# Patient Record
Sex: MALE | Race: BLACK OR AFRICAN AMERICAN | Employment: OTHER | URBAN - METROPOLITAN AREA
[De-identification: names, ages, dates, MRNs, and addresses within clinical notes are randomized per-mention and may not be internally consistent; named-entity substitution may affect disease eponyms.]

---

## 2019-06-27 ENCOUNTER — HOSPITAL ENCOUNTER (OUTPATIENT)
Dept: NUCLEAR MEDICINE | Age: 64
Discharge: HOME | End: 2019-06-27
Attending: UROLOGY
Payer: MEDICARE

## 2019-06-27 ENCOUNTER — HOSPITAL ENCOUNTER (OUTPATIENT)
Dept: CT IMAGING | Age: 64
Discharge: HOME | End: 2019-06-27
Attending: UROLOGY
Payer: MEDICARE

## 2019-06-27 DIAGNOSIS — C61 PROSTATE CANCER (HCC): ICD-10-CM

## 2019-06-27 LAB — CREAT UR-MCNC: 0.6 MG/DL (ref 0.6–1.3)

## 2019-06-27 PROCEDURE — 74177 CT ABD & PELVIS W/CONTRAST: CPT

## 2019-06-27 PROCEDURE — 74178 CT ABD&PLV WO CNTR FLWD CNTR: CPT

## 2019-06-27 PROCEDURE — 78306 BONE IMAGING WHOLE BODY: CPT

## 2019-06-27 PROCEDURE — 74011636320 HC RX REV CODE- 636/320: Performed by: UROLOGY

## 2019-06-27 PROCEDURE — 82565 ASSAY OF CREATININE: CPT

## 2019-06-27 RX ADMIN — IOPAMIDOL 100 ML: 612 INJECTION, SOLUTION INTRAVENOUS at 08:18

## 2019-06-28 ENCOUNTER — HOSPITAL ENCOUNTER (EMERGENCY)
Age: 64
Discharge: HOME | End: 2019-06-28
Attending: EMERGENCY MEDICINE
Payer: MEDICARE

## 2019-06-28 VITALS
BODY MASS INDEX: 27.83 KG/M2 | HEART RATE: 85 BPM | RESPIRATION RATE: 12 BRPM | OXYGEN SATURATION: 95 % | SYSTOLIC BLOOD PRESSURE: 128 MMHG | WEIGHT: 210 LBS | DIASTOLIC BLOOD PRESSURE: 79 MMHG | HEIGHT: 73 IN | TEMPERATURE: 98.7 F

## 2019-06-28 DIAGNOSIS — M62.838 MUSCLE SPASMS OF NECK: ICD-10-CM

## 2019-06-28 DIAGNOSIS — Z04.1 EXAM FOLLOWING MVC (MOTOR VEHICLE COLLISION), NO APPARENT INJURY: Primary | ICD-10-CM

## 2019-06-28 PROCEDURE — 99284 EMERGENCY DEPT VISIT MOD MDM: CPT

## 2019-06-28 PROCEDURE — 74011250637 HC RX REV CODE- 250/637: Performed by: EMERGENCY MEDICINE

## 2019-06-28 RX ORDER — CYCLOBENZAPRINE HCL 10 MG
5 TABLET ORAL
Status: COMPLETED | OUTPATIENT
Start: 2019-06-28 | End: 2019-06-28

## 2019-06-28 RX ORDER — IBUPROFEN 600 MG/1
600 TABLET ORAL
Status: COMPLETED | OUTPATIENT
Start: 2019-06-28 | End: 2019-06-28

## 2019-06-28 RX ORDER — CYCLOBENZAPRINE HCL 10 MG
5 TABLET ORAL
Qty: 4 TAB | Refills: 0 | Status: SHIPPED | OUTPATIENT
Start: 2019-06-28 | End: 2019-07-02

## 2019-06-28 RX ADMIN — IBUPROFEN 600 MG: 600 TABLET ORAL at 13:22

## 2019-06-28 RX ADMIN — CYCLOBENZAPRINE HYDROCHLORIDE 5 MG: 10 TABLET, FILM COATED ORAL at 13:22

## 2019-06-28 NOTE — ED NOTES
I have reviewed discharge instructions with the patient. The patient verbalized understanding. Patient armband removed and given to patient to take home. Patient was informed of the privacy risks if armband lost or stolen. Pt alert, oriented x4 and ambulatory out of ER in NAD at this time. VSS. Pain is a 5/10.
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
## 2019-06-28 NOTE — PROGRESS NOTES
Neg ct scan and bone scan for prostate mets.    Several degenerative dz    pls setup for f/u appt to determine treatment in 1-2 weeks   If he is considering surgery, setup with Dr Steven Cantrell
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
## 2024-03-22 NOTE — PROGRESS NOTES
tablet    finasteride (PROSCAR) 5 MG tablet Take 5 mg by mouth    fluticasone (FLONASE) 50 MCG/ACT nasal spray ceived the following from [facility]: Outside name: fluticasone propionate (FLONASE) 50 mcg/actuation nasal spray    HYDROcodone-acetaminophen (NORCO) 5-325 MG per tablet Take 1 tablet by mouth every 4 hours as needed.    ibuprofen (ADVIL;MOTRIN) 800 MG tablet Take 1 tablet by mouth    methocarbamol (ROBAXIN) 500 MG tablet Take 500 mg by mouth 4 times daily    polyethylene glycol (GOLYTELY) 236 g solution AS DIRECTED    rosuvastatin (CRESTOR) 10 MG tablet Take 10 mg by mouth    tamsulosin (FLOMAX) 0.4 MG capsule ceived the following from [facility]: Outside name: tamsulosin (FLOMAX) 0.4 mg capsule     No current facility-administered medications for this visit.        PHYSICAL EXAMINATION:  Temp 98 °F (36.7 °C) (Temporal)   Ht 1.854 m (6' 1\")   Wt 101.2 kg (223 lb)   BMI 29.42 kg/m²    Appearance: Alert, well appearing and pleasant patient who is in no distress, oriented to person, place/time, and who follows commands.  HEENT: Mode Griffin hears well, does not require hearing aids. His sclera of the eyes are non-icteric. He is breathing normally and no respiratory accessory muscle use is noted. No JVD present and Neck ROM within normal limits.   Psychiatric: Affect and mood are appropriate. Oriented x3  Cardiovascular/Peripheral Vascular: Normal pulses to each foot.  Integumentary: No rashes. Warm and normal color. No drainage.   Gait: Antalgic with varus knees  Sensory Exam: Intact/Normal Sensation    Lymphatic: No evidence of Lymphedema  Vascular:       Pulses: palpable  Varicosities none  Wounds/Abrasion: Well-healed arthroscopy portals right knee  Neuro: Negative, no tremors  ORTHO EXAMINATION:  Examination Right knee Left knee   Skin Well-healed arthroscopy portals Intact   Range of motion 90-10 100-0   Effusion + -   Medial joint line tenderness + +   Lateral joint

## 2024-03-28 ENCOUNTER — OFFICE VISIT (OUTPATIENT)
Age: 69
End: 2024-03-28
Payer: OTHER GOVERNMENT

## 2024-03-28 VITALS — TEMPERATURE: 98 F | WEIGHT: 223 LBS | BODY MASS INDEX: 29.55 KG/M2 | HEIGHT: 73 IN

## 2024-03-28 DIAGNOSIS — M25.561 CHRONIC PAIN OF RIGHT KNEE: ICD-10-CM

## 2024-03-28 DIAGNOSIS — M21.162 GENU VARUM OF BOTH LOWER EXTREMITIES: ICD-10-CM

## 2024-03-28 DIAGNOSIS — M17.11 UNILATERAL PRIMARY OSTEOARTHRITIS, RIGHT KNEE: Primary | ICD-10-CM

## 2024-03-28 DIAGNOSIS — G89.29 CHRONIC PAIN OF RIGHT KNEE: ICD-10-CM

## 2024-03-28 DIAGNOSIS — M21.161 GENU VARUM OF BOTH LOWER EXTREMITIES: ICD-10-CM

## 2024-03-28 PROCEDURE — 99204 OFFICE O/P NEW MOD 45 MIN: CPT | Performed by: SPECIALIST

## 2024-03-28 PROCEDURE — 1123F ACP DISCUSS/DSCN MKR DOCD: CPT | Performed by: SPECIALIST

## 2024-03-28 PROCEDURE — 73564 X-RAY EXAM KNEE 4 OR MORE: CPT | Performed by: SPECIALIST

## 2024-05-24 ENCOUNTER — HOSPITAL ENCOUNTER (OUTPATIENT)
Facility: HOSPITAL | Age: 69
End: 2024-05-24
Payer: OTHER GOVERNMENT

## 2024-05-24 ENCOUNTER — HOSPITAL ENCOUNTER (OUTPATIENT)
Facility: HOSPITAL | Age: 69
Discharge: HOME | End: 2024-05-24
Payer: OTHER GOVERNMENT

## 2024-05-24 DIAGNOSIS — M17.11 PRIMARY OSTEOARTHRITIS OF RIGHT KNEE: ICD-10-CM

## 2024-05-24 DIAGNOSIS — Z01.810 PREOP CARDIOVASCULAR EXAM: ICD-10-CM

## 2024-05-24 DIAGNOSIS — Z01.818 PREOPERATIVE TESTING: ICD-10-CM

## 2024-05-24 DIAGNOSIS — Z01.811 PRE-OP CHEST EXAM: ICD-10-CM

## 2024-05-24 LAB
ALBUMIN SERPL-MCNC: 3.6 G/DL (ref 3.4–5)
ALBUMIN/GLOB SERPL: 1 (ref 0.8–1.7)
ALP SERPL-CCNC: 93 U/L (ref 45–117)
ALT SERPL-CCNC: 24 U/L (ref 16–61)
ANION GAP SERPL CALC-SCNC: 3 MMOL/L (ref 3–18)
APPEARANCE UR: CLEAR
APTT PPP: 27.7 SEC (ref 23–36.4)
AST SERPL-CCNC: 22 U/L (ref 10–38)
BASOPHILS # BLD: 0 K/UL (ref 0–0.1)
BASOPHILS NFR BLD: 1 % (ref 0–2)
BILIRUB SERPL-MCNC: 0.6 MG/DL (ref 0.2–1)
BILIRUB UR QL: NEGATIVE
BUN SERPL-MCNC: 8 MG/DL (ref 7–18)
BUN/CREAT SERPL: 10 (ref 12–20)
CALCIUM SERPL-MCNC: 9.2 MG/DL (ref 8.5–10.1)
CHLORIDE SERPL-SCNC: 109 MMOL/L (ref 100–111)
CO2 SERPL-SCNC: 26 MMOL/L (ref 21–32)
COLOR UR: YELLOW
CREAT SERPL-MCNC: 0.77 MG/DL (ref 0.6–1.3)
DIFFERENTIAL METHOD BLD: ABNORMAL
EKG ATRIAL RATE: 76 BPM
EKG DIAGNOSIS: NORMAL
EKG P AXIS: 53 DEGREES
EKG P-R INTERVAL: 180 MS
EKG Q-T INTERVAL: 390 MS
EKG QRS DURATION: 78 MS
EKG QTC CALCULATION (BAZETT): 438 MS
EKG R AXIS: 35 DEGREES
EKG T AXIS: 44 DEGREES
EKG VENTRICULAR RATE: 76 BPM
EOSINOPHIL # BLD: 0.1 K/UL (ref 0–0.4)
EOSINOPHIL NFR BLD: 3 % (ref 0–5)
ERYTHROCYTE [DISTWIDTH] IN BLOOD BY AUTOMATED COUNT: 12.5 % (ref 11.6–14.5)
EST. AVERAGE GLUCOSE BLD GHB EST-MCNC: 82 MG/DL
GLOBULIN SER CALC-MCNC: 3.7 G/DL (ref 2–4)
GLUCOSE SERPL-MCNC: 116 MG/DL (ref 74–99)
GLUCOSE UR STRIP.AUTO-MCNC: NEGATIVE MG/DL
HBA1C MFR BLD: 4.5 % (ref 4.2–5.6)
HCT VFR BLD AUTO: 36.8 % (ref 36–48)
HGB BLD-MCNC: 12.3 G/DL (ref 13–16)
HGB UR QL STRIP: NEGATIVE
IMM GRANULOCYTES # BLD AUTO: 0 K/UL (ref 0–0.04)
IMM GRANULOCYTES NFR BLD AUTO: 0 % (ref 0–0.5)
INR PPP: 1.1 (ref 0.9–1.1)
KETONES UR QL STRIP.AUTO: NEGATIVE MG/DL
LEUKOCYTE ESTERASE UR QL STRIP.AUTO: NEGATIVE
LYMPHOCYTES # BLD: 1.6 K/UL (ref 0.9–3.6)
LYMPHOCYTES NFR BLD: 53 % (ref 21–52)
MCH RBC QN AUTO: 30 PG (ref 24–34)
MCHC RBC AUTO-ENTMCNC: 33.4 G/DL (ref 31–37)
MCV RBC AUTO: 89.8 FL (ref 78–100)
MONOCYTES # BLD: 0.2 K/UL (ref 0.05–1.2)
MONOCYTES NFR BLD: 8 % (ref 3–10)
NEUTS SEG # BLD: 1.1 K/UL (ref 1.8–8)
NEUTS SEG NFR BLD: 35 % (ref 40–73)
NITRITE UR QL STRIP.AUTO: NEGATIVE
NRBC # BLD: 0 K/UL (ref 0–0.01)
NRBC BLD-RTO: 0 PER 100 WBC
PH UR STRIP: 5.5 (ref 5–8)
PLATELET # BLD AUTO: 173 K/UL (ref 135–420)
PMV BLD AUTO: 10.2 FL (ref 9.2–11.8)
POTASSIUM SERPL-SCNC: 4 MMOL/L (ref 3.5–5.5)
PROT SERPL-MCNC: 7.3 G/DL (ref 6.4–8.2)
PROT UR STRIP-MCNC: NEGATIVE MG/DL
PROTHROMBIN TIME: 13.9 SEC (ref 11.9–14.7)
RBC # BLD AUTO: 4.1 M/UL (ref 4.35–5.65)
SODIUM SERPL-SCNC: 138 MMOL/L (ref 136–145)
SP GR UR REFRACTOMETRY: 1.01 (ref 1–1.03)
UROBILINOGEN UR QL STRIP.AUTO: 0.2 EU/DL (ref 0.2–1)
WBC # BLD AUTO: 3.1 K/UL (ref 4.6–13.2)

## 2024-05-24 PROCEDURE — 36415 COLL VENOUS BLD VENIPUNCTURE: CPT

## 2024-05-24 PROCEDURE — 83036 HEMOGLOBIN GLYCOSYLATED A1C: CPT

## 2024-05-24 PROCEDURE — 81003 URINALYSIS AUTO W/O SCOPE: CPT

## 2024-05-24 PROCEDURE — 71046 X-RAY EXAM CHEST 2 VIEWS: CPT

## 2024-05-24 PROCEDURE — 80053 COMPREHEN METABOLIC PANEL: CPT

## 2024-05-24 PROCEDURE — 85025 COMPLETE CBC W/AUTO DIFF WBC: CPT

## 2024-05-24 PROCEDURE — 85730 THROMBOPLASTIN TIME PARTIAL: CPT

## 2024-05-24 PROCEDURE — 93010 ELECTROCARDIOGRAM REPORT: CPT | Performed by: INTERNAL MEDICINE

## 2024-05-24 PROCEDURE — 93005 ELECTROCARDIOGRAM TRACING: CPT

## 2024-05-24 PROCEDURE — 85610 PROTHROMBIN TIME: CPT

## 2024-06-05 ENCOUNTER — OFFICE VISIT (OUTPATIENT)
Age: 69
End: 2024-06-05

## 2024-06-05 VITALS
DIASTOLIC BLOOD PRESSURE: 87 MMHG | SYSTOLIC BLOOD PRESSURE: 129 MMHG | TEMPERATURE: 99.5 F | WEIGHT: 218 LBS | HEIGHT: 73 IN | BODY MASS INDEX: 28.89 KG/M2

## 2024-06-05 DIAGNOSIS — G89.29 CHRONIC PAIN OF RIGHT KNEE: Primary | ICD-10-CM

## 2024-06-05 DIAGNOSIS — M25.561 CHRONIC PAIN OF RIGHT KNEE: Primary | ICD-10-CM

## 2024-06-05 PROCEDURE — 99024 POSTOP FOLLOW-UP VISIT: CPT | Performed by: PHYSICIAN ASSISTANT

## 2024-06-11 ASSESSMENT — PROMIS GLOBAL HEALTH SCALE
IN GENERAL, HOW WOULD YOU RATE YOUR PHYSICAL HEALTH [ON A SCALE OF 1 (POOR) TO 5 (EXCELLENT)]?: GOOD
IN THE PAST 7 DAYS, HOW OFTEN HAVE YOU BEEN BOTHERED BY EMOTIONAL PROBLEMS, SUCH AS FEELING ANXIOUS, DEPRESSED, OR IRRITABLE [ON A SCALE FROM 1 (NEVER) TO 5 (ALWAYS)]?: NEVER
IN THE PAST 7 DAYS, HOW WOULD YOU RATE YOUR PAIN ON AVERAGE [ON A SCALE FROM 0 (NO PAIN) TO 10 (WORST IMAGINABLE PAIN)]?: 8
WHO IS THE PERSON COMPLETING THE PROMIS V1.1 SURVEY?: SELF
SUM OF RESPONSES TO QUESTIONS 3, 6, 7, & 8: 15
IN GENERAL, PLEASE RATE HOW WELL YOU CARRY OUT YOUR USUAL SOCIAL ACTIVITIES (INCLUDES ACTIVITIES AT HOME, AT WORK, AND IN YOUR COMMUNITY, AND RESPONSIBILITIES AS A PARENT, CHILD, SPOUSE, EMPLOYEE, FRIEND, ETC) [ON A SCALE OF 1 (POOR) TO 5 (EXCELLENT)]?: VERY GOOD
HOW IS THE PROMIS V1.1 BEING ADMINISTERED?: TELEPHONE
IN THE PAST 7 DAYS, HOW WOULD YOU RATE YOUR FATIGUE ON AVERAGE [ON A SCALE FROM 1 (NONE) TO 5 (VERY SEVERE)]?: MODERATE
TO WHAT EXTENT ARE YOU ABLE TO CARRY OUT YOUR EVERYDAY PHYSICAL ACTIVITIES SUCH AS WALKING, CLIMBING STAIRS, CARRYING GROCERIES, OR MOVING A CHAIR [ON A SCALE OF 1 (NOT AT ALL) TO 5 (COMPLETELY)]?: NOT AT ALL
IN GENERAL, HOW WOULD YOU RATE YOUR MENTAL HEALTH, INCLUDING YOUR MOOD AND YOUR ABILITY TO THINK [ON A SCALE OF 1 (POOR) TO 5 (EXCELLENT)]?: VERY GOOD
IN GENERAL, WOULD YOU SAY YOUR HEALTH IS...[ON A SCALE OF 1 (POOR) TO 5 (EXCELLENT)]: GOOD
IN GENERAL, WOULD YOU SAY YOUR QUALITY OF LIFE IS...[ON A SCALE OF 1 (POOR) TO 5 (EXCELLENT)]: VERY GOOD
IN GENERAL, HOW WOULD YOU RATE YOUR SATISFACTION WITH YOUR SOCIAL ACTIVITIES AND RELATIONSHIPS [ON A SCALE OF 1 (POOR) TO 5 (EXCELLENT)]?: VERY GOOD

## 2024-06-11 ASSESSMENT — KOOS JR
GOING UP OR DOWN STAIRS: EXTREME
BENDING TO THE FLOOR TO PICK UP OBJECT: SEVERE
HOW SEVERE IS YOUR KNEE STIFFNESS AFTER FIRST WAKING IN MORNING: EXTREME
KOOS JR TOTAL INTERVAL SCORE: 36.931
TWISING OR PIVOTING ON KNEE: SEVERE
RISING FROM SITTING: MODERATE
STRAIGHTENING KNEE FULLY: MILD
STANDING UPRIGHT: SEVERE

## 2024-06-11 NOTE — PERIOP NOTE
Instructions for your surgery at [facility]      Today's Date:  6/11/2024      Patient's Name:  Mode Griffin           Surgery Date:  6/18/24              Please enter the main entrance of the hospital and check-in at the  located in the lobby. Once checked in at the , you will take the elevators to the second floor, and report to the waiting room on the left. The room will say Procedure Registration.    Do NOT eat or drink anything, including candy, gum, or ice chips after midnight prior to your surgery, unless you have specific instructions from your surgeon or anesthesia provider to do so.  Brush your teeth before coming to the hospital. You may swish with water, but do not swallow.  No smoking/Vaping/E-Cigarettes 24 hours prior to the day of surgery.  No alcohol 24 hours prior to the day of surgery.  No recreational drugs for one week prior to the day of surgery.  Bring Photo ID, Insurance information, and Co-pay if required on day of surgery.  Bring in pertinent legal documents, such as, Medical Power of , DNR, Advance Directive, etc.  Leave all valuables, including money/purse, at home.  Remove all jewelry, including ALL body piercings, nail polish, acrylic nails, and makeup (including mascara); no lotions, powders, deodorant, or perfume/cologne/after shave on the skin.  Follow instruction for Hibiclens washes and CHG wipes from surgeon's office.   Glasses and dentures may be worn to the hospital. They must be removed prior to surgery. Please bring case/container for glasses or dentures.   Contact lenses should not be worn on day of surgery.   Call your doctor's office if symptoms of a cold or illness develop within 24-48 hours prior to your surgery.  Call your doctor's office if you have any questions concerning insurance or co-pays.  15. AN ADULT (relative or friend 18 years or older) MUST DRIVE YOU HOME AFTER YOUR SURGERY.  16. Please make arrangements

## 2024-06-17 ENCOUNTER — ANESTHESIA EVENT (OUTPATIENT)
Facility: HOSPITAL | Age: 69
End: 2024-06-17
Payer: OTHER GOVERNMENT

## 2024-06-18 ENCOUNTER — ANESTHESIA (OUTPATIENT)
Facility: HOSPITAL | Age: 69
End: 2024-06-18
Payer: OTHER GOVERNMENT

## 2024-06-18 ENCOUNTER — HOSPITAL ENCOUNTER (OUTPATIENT)
Facility: HOSPITAL | Age: 69
Setting detail: OBSERVATION
Discharge: HOME | End: 2024-06-19
Attending: SPECIALIST | Admitting: SPECIALIST
Payer: OTHER GOVERNMENT

## 2024-06-18 DIAGNOSIS — G89.18 ACUTE POST-OPERATIVE PAIN: Primary | ICD-10-CM

## 2024-06-18 PROBLEM — M17.11 PRIMARY OSTEOARTHRITIS OF RIGHT KNEE: Status: ACTIVE | Noted: 2024-06-18

## 2024-06-18 PROBLEM — Z96.651 S/P TOTAL KNEE ARTHROPLASTY, RIGHT: Status: ACTIVE | Noted: 2024-06-18

## 2024-06-18 PROCEDURE — 2580000003 HC RX 258: Performed by: NURSE ANESTHETIST, CERTIFIED REGISTERED

## 2024-06-18 PROCEDURE — 64447 NJX AA&/STRD FEMORAL NRV IMG: CPT | Performed by: ANESTHESIOLOGY

## 2024-06-18 PROCEDURE — 6360000002 HC RX W HCPCS

## 2024-06-18 PROCEDURE — 27447 TOTAL KNEE ARTHROPLASTY: CPT | Performed by: SPECIALIST

## 2024-06-18 PROCEDURE — 6360000002 HC RX W HCPCS: Performed by: SPECIALIST

## 2024-06-18 PROCEDURE — 2500000003 HC RX 250 WO HCPCS: Performed by: NURSE ANESTHETIST, CERTIFIED REGISTERED

## 2024-06-18 PROCEDURE — G0378 HOSPITAL OBSERVATION PER HR: HCPCS

## 2024-06-18 PROCEDURE — 6360000002 HC RX W HCPCS: Performed by: NURSE ANESTHETIST, CERTIFIED REGISTERED

## 2024-06-18 PROCEDURE — 3700000000 HC ANESTHESIA ATTENDED CARE: Performed by: SPECIALIST

## 2024-06-18 PROCEDURE — 6370000000 HC RX 637 (ALT 250 FOR IP): Performed by: ANESTHESIOLOGY

## 2024-06-18 PROCEDURE — 2500000003 HC RX 250 WO HCPCS: Performed by: SPECIALIST

## 2024-06-18 PROCEDURE — 97530 THERAPEUTIC ACTIVITIES: CPT

## 2024-06-18 PROCEDURE — 3600000012 HC SURGERY LEVEL 2 ADDTL 15MIN: Performed by: SPECIALIST

## 2024-06-18 PROCEDURE — 2500000003 HC RX 250 WO HCPCS

## 2024-06-18 PROCEDURE — A4217 STERILE WATER/SALINE, 500 ML: HCPCS | Performed by: SPECIALIST

## 2024-06-18 PROCEDURE — 2580000003 HC RX 258: Performed by: SPECIALIST

## 2024-06-18 PROCEDURE — 3600000002 HC SURGERY LEVEL 2 BASE: Performed by: SPECIALIST

## 2024-06-18 PROCEDURE — C1713 ANCHOR/SCREW BN/BN,TIS/BN: HCPCS | Performed by: SPECIALIST

## 2024-06-18 PROCEDURE — 6370000000 HC RX 637 (ALT 250 FOR IP): Performed by: NURSE ANESTHETIST, CERTIFIED REGISTERED

## 2024-06-18 PROCEDURE — 2500000003 HC RX 250 WO HCPCS: Performed by: ANESTHESIOLOGY

## 2024-06-18 PROCEDURE — C1729 CATH, DRAINAGE: HCPCS | Performed by: SPECIALIST

## 2024-06-18 PROCEDURE — 2709999900 HC NON-CHARGEABLE SUPPLY: Performed by: SPECIALIST

## 2024-06-18 PROCEDURE — 6370000000 HC RX 637 (ALT 250 FOR IP): Performed by: SPECIALIST

## 2024-06-18 PROCEDURE — C9290 INJ, BUPIVACAINE LIPOSOME: HCPCS | Performed by: SPECIALIST

## 2024-06-18 PROCEDURE — C1776 JOINT DEVICE (IMPLANTABLE): HCPCS | Performed by: SPECIALIST

## 2024-06-18 PROCEDURE — 3700000001 HC ADD 15 MINUTES (ANESTHESIA): Performed by: SPECIALIST

## 2024-06-18 PROCEDURE — 7100000000 HC PACU RECOVERY - FIRST 15 MIN: Performed by: SPECIALIST

## 2024-06-18 PROCEDURE — 2700000000 HC OXYGEN THERAPY PER DAY

## 2024-06-18 PROCEDURE — 2580000003 HC RX 258

## 2024-06-18 PROCEDURE — 7100000001 HC PACU RECOVERY - ADDTL 15 MIN: Performed by: SPECIALIST

## 2024-06-18 PROCEDURE — 94761 N-INVAS EAR/PLS OXIMETRY MLT: CPT

## 2024-06-18 PROCEDURE — 97162 PT EVAL MOD COMPLEX 30 MIN: CPT

## 2024-06-18 PROCEDURE — 6360000002 HC RX W HCPCS: Performed by: ANESTHESIOLOGY

## 2024-06-18 DEVICE — FEMUR PS CEMENTED RIGHT, SIZE 6
Type: IMPLANTABLE DEVICE | Site: KNEE | Status: FUNCTIONAL
Brand: GMK PRIMARY TOTAL KNEE SYSTEM

## 2024-06-18 DEVICE — METACEM-X HV: Type: IMPLANTABLE DEVICE | Site: KNEE | Status: FUNCTIONAL

## 2024-06-18 DEVICE — FIXED TIBIAL TRAY CEMENTED RIGHT, SIZE 6
Type: IMPLANTABLE DEVICE | Site: KNEE | Status: FUNCTIONAL
Brand: GMK PRIMARY TOTAL KNEE SYSTEM

## 2024-06-18 DEVICE — TIBIAL INSERT PS FIXED 12MM, SIZE 6
Type: IMPLANTABLE DEVICE | Site: KNEE | Status: FUNCTIONAL
Brand: GMK PRIMARY TOTAL KNEE SYSTEM

## 2024-06-18 DEVICE — EXTENSION STEM FEM 11X30 MM KNEE PRIMARY CMNTLS GMK: Type: IMPLANTABLE DEVICE | Site: KNEE | Status: FUNCTIONAL

## 2024-06-18 RX ORDER — PROPOFOL 10 MG/ML
INJECTION, EMULSION INTRAVENOUS PRN
Status: DISCONTINUED | OUTPATIENT
Start: 2024-06-18 | End: 2024-06-18 | Stop reason: SDUPTHER

## 2024-06-18 RX ORDER — BISACODYL 5 MG/1
5 TABLET, DELAYED RELEASE ORAL DAILY
Status: DISCONTINUED | OUTPATIENT
Start: 2024-06-18 | End: 2024-06-19 | Stop reason: HOSPADM

## 2024-06-18 RX ORDER — OXYCODONE HYDROCHLORIDE 5 MG/1
5 TABLET ORAL EVERY 6 HOURS PRN
Qty: 28 TABLET | Refills: 0 | Status: SHIPPED | OUTPATIENT
Start: 2024-06-18 | End: 2024-07-02

## 2024-06-18 RX ORDER — OXYCODONE HYDROCHLORIDE 5 MG/1
5 TABLET ORAL EVERY 4 HOURS PRN
Status: DISCONTINUED | OUTPATIENT
Start: 2024-06-18 | End: 2024-06-19 | Stop reason: HOSPADM

## 2024-06-18 RX ORDER — FAMOTIDINE 20 MG/1
20 TABLET, FILM COATED ORAL ONCE
Status: COMPLETED | OUTPATIENT
Start: 2024-06-18 | End: 2024-06-18

## 2024-06-18 RX ORDER — MIDAZOLAM HYDROCHLORIDE 2 MG/2ML
2 INJECTION, SOLUTION INTRAMUSCULAR; INTRAVENOUS ONCE
Status: COMPLETED | OUTPATIENT
Start: 2024-06-18 | End: 2024-06-18

## 2024-06-18 RX ORDER — EPHEDRINE SULFATE/0.9% NACL/PF 25 MG/5 ML
SYRINGE (ML) INTRAVENOUS PRN
Status: DISCONTINUED | OUTPATIENT
Start: 2024-06-18 | End: 2024-06-18 | Stop reason: SDUPTHER

## 2024-06-18 RX ORDER — ROPIVACAINE HYDROCHLORIDE 5 MG/ML
INJECTION, SOLUTION EPIDURAL; INFILTRATION; PERINEURAL
Status: COMPLETED
Start: 2024-06-18 | End: 2024-06-18

## 2024-06-18 RX ORDER — FENTANYL CITRATE 50 UG/ML
100 INJECTION, SOLUTION INTRAMUSCULAR; INTRAVENOUS ONCE
Status: COMPLETED | OUTPATIENT
Start: 2024-06-18 | End: 2024-06-18

## 2024-06-18 RX ORDER — HYDROCODONE BITARTRATE AND ACETAMINOPHEN 5; 325 MG/1; MG/1
1 TABLET ORAL
Status: COMPLETED | OUTPATIENT
Start: 2024-06-18 | End: 2024-06-18

## 2024-06-18 RX ORDER — SODIUM CHLORIDE 0.9 % (FLUSH) 0.9 %
5-40 SYRINGE (ML) INJECTION EVERY 12 HOURS SCHEDULED
Status: DISCONTINUED | OUTPATIENT
Start: 2024-06-18 | End: 2024-06-19 | Stop reason: HOSPADM

## 2024-06-18 RX ORDER — ASPIRIN 81 MG/1
81 TABLET ORAL 2 TIMES DAILY
Status: DISCONTINUED | OUTPATIENT
Start: 2024-06-18 | End: 2024-06-19 | Stop reason: HOSPADM

## 2024-06-18 RX ORDER — ACETAMINOPHEN 500 MG
1000 TABLET ORAL EVERY 8 HOURS SCHEDULED
Status: DISCONTINUED | OUTPATIENT
Start: 2024-06-18 | End: 2024-06-19 | Stop reason: HOSPADM

## 2024-06-18 RX ORDER — FENTANYL CITRATE 50 UG/ML
INJECTION, SOLUTION INTRAMUSCULAR; INTRAVENOUS PRN
Status: DISCONTINUED | OUTPATIENT
Start: 2024-06-18 | End: 2024-06-18 | Stop reason: SDUPTHER

## 2024-06-18 RX ORDER — TRAMADOL HYDROCHLORIDE 50 MG/1
50 TABLET ORAL EVERY 6 HOURS PRN
Status: DISCONTINUED | OUTPATIENT
Start: 2024-06-18 | End: 2024-06-19 | Stop reason: HOSPADM

## 2024-06-18 RX ORDER — ONDANSETRON 2 MG/ML
INJECTION INTRAMUSCULAR; INTRAVENOUS PRN
Status: DISCONTINUED | OUTPATIENT
Start: 2024-06-18 | End: 2024-06-18 | Stop reason: SDUPTHER

## 2024-06-18 RX ORDER — SODIUM CHLORIDE 0.9 % (FLUSH) 0.9 %
5-40 SYRINGE (ML) INJECTION PRN
Status: DISCONTINUED | OUTPATIENT
Start: 2024-06-18 | End: 2024-06-18 | Stop reason: HOSPADM

## 2024-06-18 RX ORDER — SODIUM CHLORIDE 9 MG/ML
INJECTION, SOLUTION INTRAVENOUS PRN
Status: DISCONTINUED | OUTPATIENT
Start: 2024-06-18 | End: 2024-06-19 | Stop reason: HOSPADM

## 2024-06-18 RX ORDER — ROSUVASTATIN CALCIUM 10 MG/1
10 TABLET, COATED ORAL DAILY
Status: DISCONTINUED | OUTPATIENT
Start: 2024-06-18 | End: 2024-06-19 | Stop reason: HOSPADM

## 2024-06-18 RX ORDER — BISACODYL 5 MG/1
5 TABLET, DELAYED RELEASE ORAL DAILY PRN
Qty: 30 TABLET | Refills: 0 | Status: SHIPPED | OUTPATIENT
Start: 2024-06-18

## 2024-06-18 RX ORDER — METOCLOPRAMIDE HYDROCHLORIDE 5 MG/ML
10 INJECTION INTRAMUSCULAR; INTRAVENOUS
Status: DISCONTINUED | OUTPATIENT
Start: 2024-06-18 | End: 2024-06-18 | Stop reason: HOSPADM

## 2024-06-18 RX ORDER — FLUTICASONE PROPIONATE 50 MCG
1 SPRAY, SUSPENSION (ML) NASAL DAILY
Status: DISCONTINUED | OUTPATIENT
Start: 2024-06-18 | End: 2024-06-19 | Stop reason: HOSPADM

## 2024-06-18 RX ORDER — ONDANSETRON 2 MG/ML
4 INJECTION INTRAMUSCULAR; INTRAVENOUS EVERY 4 HOURS PRN
Status: DISCONTINUED | OUTPATIENT
Start: 2024-06-18 | End: 2024-06-19 | Stop reason: HOSPADM

## 2024-06-18 RX ORDER — ONDANSETRON 2 MG/ML
4 INJECTION INTRAMUSCULAR; INTRAVENOUS
Status: DISCONTINUED | OUTPATIENT
Start: 2024-06-18 | End: 2024-06-18 | Stop reason: HOSPADM

## 2024-06-18 RX ORDER — HYDROXYZINE HYDROCHLORIDE 10 MG/1
10 TABLET, FILM COATED ORAL EVERY 8 HOURS PRN
Status: DISCONTINUED | OUTPATIENT
Start: 2024-06-18 | End: 2024-06-19 | Stop reason: HOSPADM

## 2024-06-18 RX ORDER — TAMSULOSIN HYDROCHLORIDE 0.4 MG/1
0.4 CAPSULE ORAL DAILY
Status: DISCONTINUED | OUTPATIENT
Start: 2024-06-18 | End: 2024-06-19 | Stop reason: HOSPADM

## 2024-06-18 RX ORDER — DEXAMETHASONE SODIUM PHOSPHATE 4 MG/ML
INJECTION, SOLUTION INTRA-ARTICULAR; INTRALESIONAL; INTRAMUSCULAR; INTRAVENOUS; SOFT TISSUE PRN
Status: DISCONTINUED | OUTPATIENT
Start: 2024-06-18 | End: 2024-06-18 | Stop reason: SDUPTHER

## 2024-06-18 RX ORDER — ROPIVACAINE HYDROCHLORIDE 2 MG/ML
INJECTION, SOLUTION EPIDURAL; INFILTRATION; PERINEURAL
Status: COMPLETED | OUTPATIENT
Start: 2024-06-18 | End: 2024-06-18

## 2024-06-18 RX ORDER — LIDOCAINE HYDROCHLORIDE 20 MG/ML
INJECTION, SOLUTION EPIDURAL; INFILTRATION; INTRACAUDAL; PERINEURAL PRN
Status: DISCONTINUED | OUTPATIENT
Start: 2024-06-18 | End: 2024-06-18 | Stop reason: SDUPTHER

## 2024-06-18 RX ORDER — SODIUM CHLORIDE, SODIUM LACTATE, POTASSIUM CHLORIDE, CALCIUM CHLORIDE 600; 310; 30; 20 MG/100ML; MG/100ML; MG/100ML; MG/100ML
INJECTION, SOLUTION INTRAVENOUS CONTINUOUS
Status: DISCONTINUED | OUTPATIENT
Start: 2024-06-18 | End: 2024-06-18 | Stop reason: HOSPADM

## 2024-06-18 RX ORDER — FENTANYL CITRATE 50 UG/ML
50 INJECTION, SOLUTION INTRAMUSCULAR; INTRAVENOUS EVERY 5 MIN PRN
Status: DISCONTINUED | OUTPATIENT
Start: 2024-06-18 | End: 2024-06-18 | Stop reason: HOSPADM

## 2024-06-18 RX ORDER — LIDOCAINE HYDROCHLORIDE 20 MG/ML
INJECTION, SOLUTION INFILTRATION; PERINEURAL
Status: COMPLETED | OUTPATIENT
Start: 2024-06-18 | End: 2024-06-18

## 2024-06-18 RX ORDER — LIDOCAINE HYDROCHLORIDE 10 MG/ML
1 INJECTION, SOLUTION EPIDURAL; INFILTRATION; INTRACAUDAL; PERINEURAL
Status: COMPLETED | OUTPATIENT
Start: 2024-06-18 | End: 2024-06-18

## 2024-06-18 RX ORDER — TRAMADOL HYDROCHLORIDE 50 MG/1
50 TABLET ORAL EVERY 6 HOURS PRN
Qty: 28 TABLET | Refills: 0 | Status: SHIPPED | OUTPATIENT
Start: 2024-06-18 | End: 2024-06-25

## 2024-06-18 RX ORDER — HYDROXYZINE PAMOATE 50 MG/1
50 CAPSULE ORAL 3 TIMES DAILY PRN
Qty: 30 CAPSULE | Refills: 0 | Status: SHIPPED | OUTPATIENT
Start: 2024-06-18 | End: 2024-07-02

## 2024-06-18 RX ORDER — ROPIVACAINE HYDROCHLORIDE 2 MG/ML
30 INJECTION, SOLUTION EPIDURAL; INFILTRATION; PERINEURAL ONCE
Status: COMPLETED | OUTPATIENT
Start: 2024-06-18 | End: 2024-06-18

## 2024-06-18 RX ORDER — POLYETHYLENE GLYCOL 3350 17 G/17G
17 POWDER, FOR SOLUTION ORAL DAILY
Status: DISCONTINUED | OUTPATIENT
Start: 2024-06-18 | End: 2024-06-19 | Stop reason: HOSPADM

## 2024-06-18 RX ORDER — DEXTROSE, SODIUM CHLORIDE, SODIUM LACTATE, POTASSIUM CHLORIDE, AND CALCIUM CHLORIDE 5; .6; .31; .03; .02 G/100ML; G/100ML; G/100ML; G/100ML; G/100ML
INJECTION, SOLUTION INTRAVENOUS CONTINUOUS
Status: DISCONTINUED | OUTPATIENT
Start: 2024-06-18 | End: 2024-06-19 | Stop reason: HOSPADM

## 2024-06-18 RX ORDER — PHENYLEPHRINE HCL IN 0.9% NACL 1 MG/10 ML
SYRINGE (ML) INTRAVENOUS PRN
Status: DISCONTINUED | OUTPATIENT
Start: 2024-06-18 | End: 2024-06-18 | Stop reason: SDUPTHER

## 2024-06-18 RX ORDER — SODIUM CHLORIDE 0.9 % (FLUSH) 0.9 %
5-40 SYRINGE (ML) INJECTION PRN
Status: DISCONTINUED | OUTPATIENT
Start: 2024-06-18 | End: 2024-06-19 | Stop reason: HOSPADM

## 2024-06-18 RX ADMIN — TRAMADOL HYDROCHLORIDE 50 MG: 50 TABLET, COATED ORAL at 18:14

## 2024-06-18 RX ADMIN — Medication 100 MCG: at 11:34

## 2024-06-18 RX ADMIN — SODIUM CHLORIDE, SODIUM LACTATE, POTASSIUM CHLORIDE, AND CALCIUM CHLORIDE: 600; 310; 30; 20 INJECTION, SOLUTION INTRAVENOUS at 11:30

## 2024-06-18 RX ADMIN — LIDOCAINE HYDROCHLORIDE 100 MG: 20 INJECTION, SOLUTION EPIDURAL; INFILTRATION; INTRACAUDAL; PERINEURAL at 11:27

## 2024-06-18 RX ADMIN — OXYCODONE HYDROCHLORIDE 5 MG: 5 TABLET ORAL at 19:18

## 2024-06-18 RX ADMIN — DEXMEDETOMIDINE HYDROCHLORIDE 4 MCG: 100 INJECTION, SOLUTION INTRAVENOUS at 12:02

## 2024-06-18 RX ADMIN — FENTANYL CITRATE 50 MCG: 50 INJECTION INTRAMUSCULAR; INTRAVENOUS at 14:10

## 2024-06-18 RX ADMIN — FAMOTIDINE 20 MG: 20 TABLET ORAL at 09:16

## 2024-06-18 RX ADMIN — Medication 100 MCG: at 11:45

## 2024-06-18 RX ADMIN — SODIUM CHLORIDE, PRESERVATIVE FREE 10 ML: 5 INJECTION INTRAVENOUS at 20:47

## 2024-06-18 RX ADMIN — FENTANYL CITRATE 50 MCG: 50 INJECTION INTRAMUSCULAR; INTRAVENOUS at 11:57

## 2024-06-18 RX ADMIN — FENTANYL CITRATE 50 MCG: 50 INJECTION, SOLUTION INTRAMUSCULAR; INTRAVENOUS at 15:01

## 2024-06-18 RX ADMIN — ROSUVASTATIN CALCIUM 10 MG: 10 TABLET, COATED ORAL at 18:14

## 2024-06-18 RX ADMIN — DEXAMETHASONE SODIUM PHOSPHATE 4 MG: 4 INJECTION INTRA-ARTICULAR; INTRALESIONAL; INTRAMUSCULAR; INTRAVENOUS; SOFT TISSUE at 11:30

## 2024-06-18 RX ADMIN — ROPIVACAINE HYDROCHLORIDE 100 MG: 5 INJECTION EPIDURAL; INFILTRATION; PERINEURAL at 09:59

## 2024-06-18 RX ADMIN — BISACODYL 5 MG: 5 TABLET, COATED ORAL at 18:15

## 2024-06-18 RX ADMIN — WATER 2000 MG: 1 INJECTION INTRAMUSCULAR; INTRAVENOUS; SUBCUTANEOUS at 20:46

## 2024-06-18 RX ADMIN — EPHEDRINE SULFATE 10 MG: 5 INJECTION INTRAVENOUS at 11:41

## 2024-06-18 RX ADMIN — TRANEXAMIC ACID 1000 MG: 100 INJECTION, SOLUTION INTRAVENOUS at 11:32

## 2024-06-18 RX ADMIN — ROPIVACAINE HYDROCHLORIDE 20 ML: 2 INJECTION EPIDURAL; INFILTRATION; PERINEURAL at 09:59

## 2024-06-18 RX ADMIN — ROPIVACAINE HYDROCHLORIDE 20 ML: 2 INJECTION, SOLUTION EPIDURAL; INFILTRATION at 09:57

## 2024-06-18 RX ADMIN — FENTANYL CITRATE 50 MCG: 50 INJECTION, SOLUTION INTRAMUSCULAR; INTRAVENOUS at 15:18

## 2024-06-18 RX ADMIN — PROPOFOL 150 MG: 10 INJECTION, EMULSION INTRAVENOUS at 11:27

## 2024-06-18 RX ADMIN — DEXMEDETOMIDINE HYDROCHLORIDE 4 MCG: 100 INJECTION, SOLUTION INTRAVENOUS at 12:45

## 2024-06-18 RX ADMIN — SODIUM CHLORIDE, SODIUM LACTATE, POTASSIUM CHLORIDE, AND CALCIUM CHLORIDE: 600; 310; 30; 20 INJECTION, SOLUTION INTRAVENOUS at 09:16

## 2024-06-18 RX ADMIN — LIDOCAINE HYDROCHLORIDE 1 ML: 10 INJECTION, SOLUTION EPIDURAL; INFILTRATION; INTRACAUDAL; PERINEURAL at 09:59

## 2024-06-18 RX ADMIN — TRANEXAMIC ACID 1000 MG: 100 INJECTION, SOLUTION INTRAVENOUS at 13:40

## 2024-06-18 RX ADMIN — TAMSULOSIN HYDROCHLORIDE 0.4 MG: 0.4 CAPSULE ORAL at 18:14

## 2024-06-18 RX ADMIN — ACETAMINOPHEN 1000 MG: 500 TABLET ORAL at 21:48

## 2024-06-18 RX ADMIN — FENTANYL CITRATE 50 MCG: 50 INJECTION, SOLUTION INTRAMUSCULAR; INTRAVENOUS at 14:38

## 2024-06-18 RX ADMIN — FENTANYL CITRATE 25 MCG: 50 INJECTION INTRAMUSCULAR; INTRAVENOUS at 14:16

## 2024-06-18 RX ADMIN — FENTANYL CITRATE 50 MCG: 50 INJECTION INTRAMUSCULAR; INTRAVENOUS at 11:25

## 2024-06-18 RX ADMIN — HYDROCODONE BITARTRATE AND ACETAMINOPHEN 1 TABLET: 5; 325 TABLET ORAL at 16:24

## 2024-06-18 RX ADMIN — ONDANSETRON 4 MG: 2 INJECTION INTRAMUSCULAR; INTRAVENOUS at 18:05

## 2024-06-18 RX ADMIN — FENTANYL CITRATE 25 MCG: 50 INJECTION INTRAMUSCULAR; INTRAVENOUS at 12:48

## 2024-06-18 RX ADMIN — FENTANYL CITRATE 50 MCG: 50 INJECTION, SOLUTION INTRAMUSCULAR; INTRAVENOUS at 09:58

## 2024-06-18 RX ADMIN — WATER 2000 MG: 1 INJECTION, SOLUTION INTRAMUSCULAR; INTRAVENOUS; SUBCUTANEOUS at 11:32

## 2024-06-18 RX ADMIN — MIDAZOLAM 2 MG: 1 INJECTION INTRAMUSCULAR; INTRAVENOUS at 09:58

## 2024-06-18 RX ADMIN — Medication 100 MCG: at 13:48

## 2024-06-18 RX ADMIN — ONDANSETRON 4 MG: 2 INJECTION INTRAMUSCULAR; INTRAVENOUS at 14:05

## 2024-06-18 RX ADMIN — ASPIRIN 81 MG: 81 TABLET, COATED ORAL at 20:47

## 2024-06-18 RX ADMIN — LIDOCAINE HYDROCHLORIDE 5 ML: 20 INJECTION, SOLUTION INFILTRATION; PERINEURAL at 09:57

## 2024-06-18 RX ADMIN — Medication 100 MCG: at 11:39

## 2024-06-18 RX ADMIN — DEXMEDETOMIDINE HYDROCHLORIDE 4 MCG: 100 INJECTION, SOLUTION INTRAVENOUS at 12:16

## 2024-06-18 ASSESSMENT — PAIN DESCRIPTION - LOCATION
LOCATION: INCISION;KNEE
LOCATION: KNEE
LOCATION: KNEE
LOCATION: INCISION;KNEE
LOCATION: INCISION;KNEE
LOCATION: KNEE
LOCATION: KNEE

## 2024-06-18 ASSESSMENT — PAIN DESCRIPTION - DESCRIPTORS
DESCRIPTORS: SHARP;TIGHTNESS
DESCRIPTORS: ACHING;TIGHTNESS
DESCRIPTORS: SHARP
DESCRIPTORS: ACHING;TIGHTNESS
DESCRIPTORS: ACHING
DESCRIPTORS: ACHING

## 2024-06-18 ASSESSMENT — PAIN DESCRIPTION - ORIENTATION
ORIENTATION: RIGHT

## 2024-06-18 ASSESSMENT — PAIN DESCRIPTION - PAIN TYPE
TYPE: SURGICAL PAIN

## 2024-06-18 ASSESSMENT — PAIN SCALES - GENERAL
PAINLEVEL_OUTOF10: 9
PAINLEVEL_OUTOF10: 7
PAINLEVEL_OUTOF10: 8
PAINLEVEL_OUTOF10: 6
PAINLEVEL_OUTOF10: 6
PAINLEVEL_OUTOF10: 8
PAINLEVEL_OUTOF10: 7
PAINLEVEL_OUTOF10: 0

## 2024-06-18 ASSESSMENT — PAIN DESCRIPTION - FREQUENCY: FREQUENCY: CONTINUOUS

## 2024-06-18 ASSESSMENT — PAIN - FUNCTIONAL ASSESSMENT
PAIN_FUNCTIONAL_ASSESSMENT: 0-10
PAIN_FUNCTIONAL_ASSESSMENT: ACTIVITIES ARE NOT PREVENTED
PAIN_FUNCTIONAL_ASSESSMENT: ACTIVITIES ARE NOT PREVENTED

## 2024-06-18 ASSESSMENT — PAIN DESCRIPTION - ONSET: ONSET: ON-GOING

## 2024-06-18 NOTE — OP NOTE
Operative Note      Patient: Mode Griffin     Date of Surgery: 6/18/2024         YOB: 1955      Age:  68 y.o.        LOS:  LOS: 0 days       Preoperative Diagnosis:  Osteoarthritis of right knee, unspecified osteoarthritis type [M17.11]    Postoperative Diagnosis: Same     Surgeon:  Ishmael Anderson MD     Assistant:  Feliciano Steiner    Anesthesia:  general anesthesia and adductor block     Procedure:  Procedure(s):  RIGHT TOTAL KNEE REPLACEMENT    Time out performed: YES    Estimated Blood Loss:  min           Implants:    Implant Name Type Inv. Item Serial No.  Lot No. LRB No. Used Action   METACEM-X HV - [id number]  METACEM-X HV  MEDACTA Altierre [id number] Right 1 Implanted   EXTENSION STEM FEM 11X30 MM KNEE PRIMARY CMNTLS GMK - [id number]  EXTENSION STEM FEM 11X30 MM KNEE PRIMARY CMNTLS GMK  MEDACTA USA-WD [id number] Right 1 Implanted   COMPONENT FEM SZ 6 RT POST STBL MADINA GMK - [id number]  COMPONENT FEM SZ 6 RT POST STBL MADINA GMK  MEDACTA USA-WD [id number] Right 1 Implanted   Patella resurfacing    MEDACTA USA-WD [id number] Right 1 Implanted   COMPONENT TIB SZ 6 RT FIX MADINA GMK - [id number]  COMPONENT TIB SZ 6 RT FIX MADINA GMK  [employer]ACT[employer] USA-WD [id number] Right 1 Implanted   INSERT TIB SZ 6 [id number] POST STBL FIX GMK - [id number]  INSERT TIB SZ 6 [id number] POST STBL FIX GMK  MEDACTA USA-WD [id number] Right 1 Implanted       Specimens: * No specimens in log *            Complications:  None    DESCRIPTION OF PROCEDURE: After satisfactory general and adductor block anesthesia, in the supine position, patient's knee was prepped with ChloraPrep solution and draped in the usual fashion for knee replacement. The tourniquet was inflated to 350 mmHg after exsanguination and elevation. A longitudinal anterior skin incision was made carried down through the subcutaneus tissue to the underlying extensor mechanism. A medial parapatellar arthrotomy was carried proximally into the medial quadriceps tendon. The patella

## 2024-06-18 NOTE — PERIOP NOTE
TRANSFER - OUT REPORT:    Verbal report given to Christina NICK on Mode Griffin  being transferred to 2 Surgical for routine post-op       Report consisted of patient's Situation, Background, Assessment and   Recommendations(SBAR).     Information from the following report(s) Nurse Handoff Report, Adult Overview, Surgery Report, Intake/Output, and MAR was reviewed with the receiving nurse.           Lines:   Peripheral IV 06/18/24 Left Antecubital (Active)   Site Assessment Clean, dry & intact 06/18/24 1547   Line Status Infusing 06/18/24 1547   Line Care Connections checked and tightened 06/18/24 1547   Phlebitis Assessment No symptoms 06/18/24 1547   Infiltration Assessment 0 06/18/24 1547   Dressing Status Clean, dry & intact 06/18/24 1547   Dressing Type Transparent 06/18/24 1547        Opportunity for questions and clarification was provided.      Patient transported with:  Registered Nurse

## 2024-06-18 NOTE — ANESTHESIA POSTPROCEDURE EVALUATION
Department of Anesthesiology  Postprocedure Note    Patient: Mode Griffin  MRN: [id number]  YOB: 1955  Date of evaluation: 6/18/2024    Procedure Summary       Date: 06/18/24 Room / Location: [facility] MAIN 06 / [facility] MAIN OR    Anesthesia Start: 1123 Anesthesia Stop: 1427    Procedure: RIGHT TOTAL KNEE REPLACEMENT (Right: Knee) Diagnosis:       Osteoarthritis of right knee, unspecified osteoarthritis type      (Osteoarthritis of right knee, unspecified osteoarthritis type [M17.11])    Surgeons: Ishmael Anderson MD Responsible Provider: Beck Ding MD    Anesthesia Type: General, Regional ASA Status: 3            Anesthesia Type: General, Regional    Peterson Phase I: Peterson Score: 10    Peterson Phase II:      Anesthesia Post Evaluation    Patient location during evaluation: bedside  Patient participation: complete - patient participated  Level of consciousness: awake  Pain score: 6  Airway patency: patent  Nausea & Vomiting: no nausea  Cardiovascular status: hemodynamically stable  Respiratory status: acceptable  Hydration status: euvolemic  Pain management: satisfactory to patient        No notable events documented.

## 2024-06-18 NOTE — BRIEF OP NOTE
Brief Postoperative Note      Patient: Mode Griffin  YOB: 1955  MRN: [id number]    Date of Procedure: 6/18/2024    Pre-Op Diagnosis Codes:     * Osteoarthritis of right knee, unspecified osteoarthritis type [M17.11]    Post-Op Diagnosis: Same       Procedure(s):  RIGHT TOTAL KNEE REPLACEMENT    Surgeon(s):  Ishmael Anderson MD    Assistant:  Surgical Assistant: Alfredo Elizabeth Sara    Anesthesia: General    Estimated Blood Loss (mL): less than 50     Complications: None    Specimens:   * No specimens in log *    Implants:  Implant Name Type Inv. Item Serial No.  Lot No. LRB No. Used Action   METACEM-X HV - [id number]  METACEM-X HV  MEDACTA Tapad [id number] Right 1 Implanted   EXTENSION STEM FEM 11X30 MM KNEE PRIMARY CMNTLS GMK - [id number]  EXTENSION STEM FEM 11X30 MM KNEE PRIMARY CMNTLS GMK  MEDACTA USA-WD [id number] Right 1 Implanted   COMPONENT FEM SZ 6 RT POST STBL MADINA GMK - [id number]  COMPONENT FEM SZ 6 RT POST STBL MADINA GMK  MEDACTA USA-WD [id number] Right 1 Implanted   Patella resurfacing    MEDACTA USA-WD [id number] Right 1 Implanted   COMPONENT TIB SZ 6 RT FIX MADINA GMK - [id number]  COMPONENT TIB SZ 6 RT FIX MADINA GMK  MEDACTA [employer] [id number] Right 1 Implanted   INSERT TIB SZ 6 [id number] POST STBL FIX GMK - [id number]  INSERT TIB SZ 6 [id number] POST STBL FIX GMK  MEDACTA USA-WD [id number] Right 1 Implanted         Drains:   Urinary Catheter 06/18/24 2 Way (Active)       Findings:  Infection Present At Time Of Surgery (PATOS) (choose all levels that have infection present):  No infection present  Other Findings: above    Electronically signed by Ishmael Anderson MD on 6/18/2024 at 2:02 PM

## 2024-06-18 NOTE — NURSE NAVIGATOR
Rounded on patient s/p right total knee replacement with Dr. Anderson, dos 06/18/2024. Patient observed to be alert and oriented x 3, sitting up in bedside chair. He denies chest pain, shortness of breath, nausea or vomiting. Patient states that he has numbness in his foot and he is unable to feel it. Loosened ace wrap on patient and re rapped. Attempted to patient at chair side with walker as he is able to fire his quadricept at this time. Patient stood at chair side with walker for one minute and stated his arms and legs felt weak , he couldn't support himself on his legs and he felt nauseous. Patient seated back in chair , RN notified that patient was unable to clear physical therapy at this time and is complaining of nausea. Will see patient tomorrow.

## 2024-06-18 NOTE — ANESTHESIA PROCEDURE NOTES
Peripheral Block    Patient location during procedure: pre-op  Reason for block: post-op pain management and at surgeon's request  Start time: 6/18/2024 9:57 AM  End time: 6/18/2024 10:05 AM  Staffing  Anesthesiologist: Beck Ding MD  Performed by: Beck Ding MD  Authorized by: Beck Ding MD    Preanesthetic Checklist  Completed: patient identified, IV checked, site marked, risks and benefits discussed, surgical/procedural consents, equipment checked, timeout performed, anesthesia consent given, oxygen available, monitors applied/VS acknowledged and fire risk safety assessment completed and verbalized  Peripheral Block   Patient position: supine  Prep: ChloraPrep  Patient monitoring: cardiac monitor, continuous pulse ox, oxygen, responsive to questions and frequent blood pressure checks  Block type: Femoral  Laterality: right  Injection technique: single-shot  Guidance: nerve stimulator and ultrasound guided  Local infiltration: ropivacaine  Local infiltration: ropivacaine    Needle   Needle type: insulated echogenic nerve stimulator needle   Needle gauge: 22 G  Needle localization: nerve stimulator and ultrasound guidance  Assessment   Injection assessment: negative aspiration for heme, no paresthesia on injection, local visualized surrounding nerve on ultrasound and no intravascular symptoms  Hemodynamics: stable  Outcomes: uncomplicated    Medications Administered  ropivacaine (NAROPIN) injection 0.2% - Perineural   20 mL - 6/18/2024 9:57:00 AM  lidocaine injection 2% - IntraDERmal   5 mL - 6/18/2024 9:57:00 AM

## 2024-06-18 NOTE — H&P
arthroscopy portals Intact   Range of motion 90-10 100-0   Effusion + -   Medial joint line tenderness + +   Lateral joint line tenderness - -   Popliteal tenderness - -   Osteophytes palpable + +   Naveed’s - -   Patella crepitus + +   Anterior drawer - -   Lateral laxity - -   Medial laxity - -   Varus deformity + -   Valgus deformity - -   Pretibial edema - -   Calf tenderness - -      RADIOGRAPHS:   03/28/24  3 VIEWS & LONG VIEW RT KNEE KEN  Three views of right knee: no fractures, no effusion, severe end-staged osteoarthritis, osteophytes present. 6.9 degree femoral valgus angle. Severe varus deformities bilateral.     12/12/23 XR RT KNEE [facility]  There are no acute osseus abnormalities. Severe tricompartmental osteoarthritis.  I independently reviewed these images today.  Severe end staged osteoarthritis, large osteophytes, varus deformity.     IMPRESSION:        ICD-10-CM     1. Unilateral primary osteoarthritis, right knee  M17.11 [91965] Knee 4V       2. Chronic pain of right knee  M25.561 [96439] Knee 4V     G89.29         3. Genu varum of both lower extremities  M21.161       M21.162            PLAN: Recommended right total Knee arthroplasty. Discussed risks, benefits, and procedure details. Patient elects to proceed. AVS on TKR provided.  H&P completed.

## 2024-06-18 NOTE — INTERVAL H&P NOTE
Update History & Physical    The patient's History and Physical of June 18, 2024 was reviewed with the patient and I examined the patient. There was no change. The surgical site was confirmed by the patient and me.     Plan: The risks, benefits, expected outcome, and alternative to the recommended procedure have been discussed with the patient. Patient understands and wants to proceed with the procedure.     Electronically signed by Ishmael Anderson MD on 6/18/2024 at 9:43 AM

## 2024-06-18 NOTE — DISCHARGE INSTRUCTIONS
so think of one that is secure and easy to remember.  Create a [employer] password. You can change your password at any time.  Enter your Password Reset Question and Answer. This can be used at a later time if you forget your password.   Enter your e-mail address. You will receive e-mail notification when new information is available in [employer].  Click Sign Up. You can now view your medical record.     Additional Information  If you have questions, please contact your physician practice where you receive care. Remember, [employer] is NOT to be used for urgent needs. For medical emergencies, dial 911.   The discharge information has been reviewed with the {PATIENT PARENT GUARDIAN:[id number]}.  The {PATIENT PARENT GUARDIAN:77028} verbalized understanding.  Discharge medications reviewed with the {Dishcarge meds reviewed with:[id number]} and appropriate educational materials and side effects teaching were provided.  ___________________________________________________________________________________________________________________________________

## 2024-06-18 NOTE — PERIOP NOTE
Upon getting ready for discharge to 2 Surgical Patient has c/o 6/10 pain to right knee. This nurse spoke with Dr. Ding in anesthesia and reviewed respiratory status and d/t respiratory status with IV pain medication Dr. Ding gave verbal order Norco 5/325mg PO 1 tab once PRN. Order verbal read back performed and transcribed as per MD order. Patient made aware and notified. No questions or concerns noted at this time.

## 2024-06-18 NOTE — ANESTHESIA PRE PROCEDURE
Department of Anesthesiology  Preprocedure Note       Name:  Mode Griffin   Age:  68 y.o.  :  1955                                          MRN:  [id number]         Date:  2024      Surgeon: Surgeon(s):  Ishmael Anderson MD    Procedure: Procedure(s):  RIGHT TOTAL KNEE ARTHROPLASTY; [MEDACTA ORTHO]; 2 SA’S; ADDUCTOR CANAL NERVE BLOCK; 23 HR    Medications prior to admission:   Prior to Admission medications    Medication Sig Start Date End Date Taking? Authorizing Provider   amLODIPine (NORVASC) 5 MG tablet Take 1 tablet by mouth daily  Patient not taking: Reported on 2024    Automatic Reconciliation, Ar   amoxicillin-clavulanate (AUGMENTIN) 875-125 MG per tablet ceived the following from [facility]: Outside name: amoxicillin-clavulanate (AUGMENTIN) 875-125 mg per tablet  Patient not taking: Reported on 2024   Automatic Reconciliation, Ar   finasteride (PROSCAR) 5 MG tablet Take 5 mg by mouth  Patient not taking: Reported on 2024    Automatic Reconciliation, Ar   fluticasone (FLONASE) 50 MCG/ACT nasal spray ceived the following from [facility]: Outside name: fluticasone propionate (FLONASE) 50 mcg/actuation nasal spray 19   Automatic Reconciliation, Ar   HYDROcodone-acetaminophen (NORCO) 5-325 MG per tablet Take 1 tablet by mouth every 4 hours as needed. 1/30/15   Automatic Reconciliation, Ar   ibuprofen (ADVIL;MOTRIN) 800 MG tablet Take 1 tablet by mouth    Automatic Reconciliation, Ar   methocarbamol (ROBAXIN) 500 MG tablet Take 500 mg by mouth 4 times daily  Patient not taking: Reported on 2024 1/30/15   Automatic Reconciliation, Ar   polyethylene glycol (GOLYTELY) 236 g solution AS DIRECTED  Patient not taking: Reported on 17   Automatic Reconciliation, Ar   rosuvastatin (CRESTOR) 10 MG tablet Take 1 tablet by mouth daily    Automatic Reconciliation, Ar   tamsulosin (FLOMAX) 0.4 MG capsule Take 1 capsule by mouth

## 2024-06-19 VITALS
SYSTOLIC BLOOD PRESSURE: 153 MMHG | HEART RATE: 94 BPM | BODY MASS INDEX: 28.9 KG/M2 | WEIGHT: 218.03 LBS | DIASTOLIC BLOOD PRESSURE: 93 MMHG | OXYGEN SATURATION: 98 % | HEIGHT: 73 IN | RESPIRATION RATE: 19 BRPM | TEMPERATURE: 97.6 F

## 2024-06-19 PROCEDURE — 97110 THERAPEUTIC EXERCISES: CPT

## 2024-06-19 PROCEDURE — 94761 N-INVAS EAR/PLS OXIMETRY MLT: CPT

## 2024-06-19 PROCEDURE — 6370000000 HC RX 637 (ALT 250 FOR IP): Performed by: SPECIALIST

## 2024-06-19 PROCEDURE — 2580000003 HC RX 258: Performed by: SPECIALIST

## 2024-06-19 PROCEDURE — 97166 OT EVAL MOD COMPLEX 45 MIN: CPT

## 2024-06-19 PROCEDURE — G0378 HOSPITAL OBSERVATION PER HR: HCPCS

## 2024-06-19 PROCEDURE — 97535 SELF CARE MNGMENT TRAINING: CPT

## 2024-06-19 PROCEDURE — 97116 GAIT TRAINING THERAPY: CPT

## 2024-06-19 PROCEDURE — 6360000002 HC RX W HCPCS: Performed by: SPECIALIST

## 2024-06-19 RX ADMIN — ASPIRIN 81 MG: 81 TABLET, COATED ORAL at 09:30

## 2024-06-19 RX ADMIN — BISACODYL 5 MG: 5 TABLET, COATED ORAL at 09:30

## 2024-06-19 RX ADMIN — OXYCODONE HYDROCHLORIDE 5 MG: 5 TABLET ORAL at 06:40

## 2024-06-19 RX ADMIN — TAMSULOSIN HYDROCHLORIDE 0.4 MG: 0.4 CAPSULE ORAL at 09:30

## 2024-06-19 RX ADMIN — ROSUVASTATIN CALCIUM 10 MG: 10 TABLET, COATED ORAL at 09:30

## 2024-06-19 RX ADMIN — WATER 2000 MG: 1 INJECTION INTRAMUSCULAR; INTRAVENOUS; SUBCUTANEOUS at 05:30

## 2024-06-19 RX ADMIN — ACETAMINOPHEN 1000 MG: 500 TABLET ORAL at 05:43

## 2024-06-19 RX ADMIN — OXYCODONE HYDROCHLORIDE 5 MG: 5 TABLET ORAL at 02:25

## 2024-06-19 ASSESSMENT — PAIN - FUNCTIONAL ASSESSMENT
PAIN_FUNCTIONAL_ASSESSMENT: ACTIVITIES ARE NOT PREVENTED

## 2024-06-19 ASSESSMENT — PAIN DESCRIPTION - ORIENTATION
ORIENTATION: RIGHT

## 2024-06-19 ASSESSMENT — PAIN SCALES - WONG BAKER
WONGBAKER_NUMERICALRESPONSE: NO HURT
WONGBAKER_NUMERICALRESPONSE: NO HURT
WONGBAKER_NUMERICALRESPONSE: HURTS A LITTLE BIT

## 2024-06-19 ASSESSMENT — PAIN DESCRIPTION - DIRECTION: RADIATING_TOWARDS: RIGHT LEG

## 2024-06-19 ASSESSMENT — PAIN DESCRIPTION - DESCRIPTORS
DESCRIPTORS: ACHING

## 2024-06-19 ASSESSMENT — PAIN DESCRIPTION - FREQUENCY
FREQUENCY: INTERMITTENT
FREQUENCY: INTERMITTENT

## 2024-06-19 ASSESSMENT — PAIN SCALES - GENERAL
PAINLEVEL_OUTOF10: 2
PAINLEVEL_OUTOF10: 8
PAINLEVEL_OUTOF10: 2
PAINLEVEL_OUTOF10: 9
PAINLEVEL_OUTOF10: 0
PAINLEVEL_OUTOF10: 0

## 2024-06-19 ASSESSMENT — PAIN DESCRIPTION - LOCATION
LOCATION: KNEE

## 2024-06-19 ASSESSMENT — PAIN DESCRIPTION - ONSET: ONSET: GRADUAL

## 2024-06-19 ASSESSMENT — PAIN DESCRIPTION - PAIN TYPE
TYPE: SURGICAL PAIN
TYPE: SURGICAL PAIN

## 2024-06-19 NOTE — NURSE NAVIGATOR
Rounded on patient s/p right total knee replacement with Dr. Anderson, dos 06/18/2024. Patient observed to be alert and oriented x 3, sitting up in bedside chair. He denies chest pain, shortness of breath, nausea or vomiting or calf pain. He states that numbness to right foot has improved and that he has been up walking to the restroom , voiding without difficulty. He states that pain has been well controlled throughout the night. Ace wrap and cotton removed. Dressing observed to be clean, dry and intact. Patient leg rewrapped with ace wrap. Patient educated that ace wrap may be worn for compression throughout the day to assist with swelling but should be removed at night. Patient provided with total knee replacement education book, medication education sheet, and medication schedule.  Reviewed the use of incentive spirometry. Patient encouraged to use ten times hourly while in hospital and to continue use at home in the morning hours to keep lungs expanded and free from complications. Reviewed postoperative showering instructions. Patient reminded that he may shower in two days no tubs or submersion in water.    He is to contact clinic with any dressing issues. Reminded patient of the importance of ambulation to his recovery. Encouraged hourly ambulation short walks  every hour, followed by icing 20 minutes, not to be placed directly on his skin. Patient verbalized understanding of all information provided. All questions were answered.  Patient has all required DME at home and a support system in place . He has been cleared safe for discharge by PT/ OT at this time and has already obtained his postoperative medications. He will discharge home with home health and home physical [facility]. Will follow patient postoperatively.

## 2024-06-19 NOTE — PROGRESS NOTES
4 Eyes Skin Assessment     NAME:  Mode Griffin  YOB: 1955  MEDICAL RECORD NUMBER:  [id number]    The patient is being assessed for  Shift Handoff    I agree that at least one RN has performed a thorough Head to Toe Skin Assessment on the patient. ALL assessment sites listed below have been assessed.      Areas assessed by both nurses:    Head, Face, Ears, Shoulders, Back, Chest, Arms, Elbows, Hands, Sacrum. Buttock, Coccyx, Ischium, Legs. Feet and Heels, and Under Medical Devices         Does the Patient have a Wound? Yes wound(s) were present on assessment. LDA wound assessment was Initiated and completed by RN       Chaparro Prevention initiated by RN: Yes  Wound Care Orders initiated by RN: Yes    Pressure Injury (Stage 3,4, Unstageable, DTI, NWPT, and Complex wounds) if present, place Wound referral order by RN under : Yes    New Ostomies, if present place, Ostomy referral order under : Yes     Nurse 1 eSignature: Electronically signed by Rojas Michelle RN on 6/19/24 at 7:43 AM EDT    **SHARE this note so that the co-signing nurse can place an eSignature**    Nurse 2 eSignature: {Esignature:[id number]}

## 2024-06-19 NOTE — PROGRESS NOTES
Discharge teaching completed at bedside with patient. Opportunity provided for clarifying questions. All answered to patient satisfaction. . IV removed . ID removed and shredded. Patient discharged via wheelchair.

## 2024-06-19 NOTE — PROGRESS NOTES
Patient compliant of numbness and tingling at the back of right big toe. Nurse assess the back of the toe, there is no discoloration, the site is warm, the capillary refill on the toe is less than 3 seconds, the ace wrap around the patient's feet is not tight according to the patient, movement of toe without difficulty is noted.Nurse notify the on call for patient's attending, order is given to nurse to continue to monitor patient and that report should be given to oncoming nurse to report to patient's attending in the morning. Nurse notify the patient.

## 2024-06-19 NOTE — PROGRESS NOTES
OCCUPATIONAL THERAPY EVALUATION/DISCHARGE    Patient: Mode Griffin (68 y.o. male)  Date: 6/19/2024  Primary Diagnosis: Osteoarthritis of right knee, unspecified osteoarthritis type [M17.11]  S/P total knee arthroplasty, right [Z96.651]  Procedure(s) (LRB):  RIGHT TOTAL KNEE REPLACEMENT (Right) 1 Day Post-Op   Precautions: General Precautions, Weight Bearing, Right Lower Extremity Weight Bearing: Weight Bearing As Tolerated  PLOF: Pt was independent with self-care and used a RW for functional mobility.      ASSESSMENT AND RECOMMENDATIONS:  Pt cleared to participate in OT evaluation by RN. Pt supine in bed, alert, and agreeable to participate with supportive daughter present. Pt educated on weight-bearing status, importance of ice, towel rolled underneath ankle, and safety during this admission/ around the house. Pt min assist for RLE to EOB with supine > sit, stand by assist for scooting and min assist for standing. Patient contact guard assist for donning shorts standing and modified independent for donning shirt. Patient able to perform functional mobility from bed to chair in preparation for ADLs approx. 14 ft. using rolling walker, 2 rest breaks needed. Pt educated on adaptive equipment for lower body dressing and how to don/doff socks using sock-aid. Pt/daughter with no further questions. Pt given a long handled sponge as pt already has long handled shoe horn at home. Based on the objective data described below, pt presents with no deficits that impede pt function with ADLs. At this time pt is safe to d/c home with family support from selfcare standpoint. Pt left in recliner eating breakfast all needs met and call bell in reach.      Maximum therapeutic gains met at current level of care and patient will be discharged from occupational therapy at this time.    Further Equipment Recommendations for Discharge: Pt has all DME    [facility]: AM-PAC Inpatient Daily Activity Raw Score: 21    Current research shows that an  Trevor

## 2024-06-21 ENCOUNTER — TELEPHONE (OUTPATIENT)
Facility: HOSPITAL | Age: 69
End: 2024-06-21

## 2024-06-21 NOTE — TELEPHONE ENCOUNTER
Call placed to patient, ID verified x 2. Patient is s/p right total knee replacement with Dr. Anderson, dos 06/18/2024. He denies chest pain, shortness of breath, nausea, vomiting, fever, chills or calf pain. He denies any residual numbness to his right lower extremities, he denies any difficulty with bowel or bladder. He states that pain has remained consistent but has been controlled with medication that he is taking as prescribed. He reports ambulating hourly with his walker and icing to assist with pain and stiffness. His dressing is reported as clean, dry and intact. He states that his is currently working with physical therapy at this time. Overall he feels he is doing very well. He has no questions or concerns . He will follow up with Dr. Anderson in two weeks or sooner if needed.

## 2024-06-23 ENCOUNTER — HOSPITAL ENCOUNTER (EMERGENCY)
Facility: HOSPITAL | Age: 69
Discharge: HOME | End: 2024-06-24
Attending: STUDENT IN AN ORGANIZED HEALTH CARE EDUCATION/TRAINING PROGRAM
Payer: OTHER GOVERNMENT

## 2024-06-23 ENCOUNTER — APPOINTMENT (OUTPATIENT)
Facility: HOSPITAL | Age: 69
End: 2024-06-23
Payer: OTHER GOVERNMENT

## 2024-06-23 VITALS
DIASTOLIC BLOOD PRESSURE: 78 MMHG | RESPIRATION RATE: 22 BRPM | SYSTOLIC BLOOD PRESSURE: 135 MMHG | WEIGHT: 218 LBS | OXYGEN SATURATION: 98 % | BODY MASS INDEX: 28.89 KG/M2 | HEART RATE: 94 BPM | TEMPERATURE: 98 F | HEIGHT: 73 IN

## 2024-06-23 DIAGNOSIS — Z98.890 HX OF KNEE SURGERY: ICD-10-CM

## 2024-06-23 DIAGNOSIS — R06.02 SHORTNESS OF BREATH: Primary | ICD-10-CM

## 2024-06-23 LAB
ALBUMIN SERPL-MCNC: 3.2 G/DL (ref 3.4–5)
ALBUMIN/GLOB SERPL: 0.8 (ref 0.8–1.7)
ALP SERPL-CCNC: 82 U/L (ref 45–117)
ALT SERPL-CCNC: 25 U/L (ref 16–61)
ANION GAP SERPL CALC-SCNC: 11 MMOL/L (ref 3–18)
AST SERPL-CCNC: 29 U/L (ref 10–38)
BASOPHILS # BLD: 0 K/UL (ref 0–0.1)
BASOPHILS NFR BLD: 0 % (ref 0–2)
BILIRUB SERPL-MCNC: 1.2 MG/DL (ref 0.2–1)
BUN SERPL-MCNC: 12 MG/DL (ref 7–18)
BUN/CREAT SERPL: 13 (ref 12–20)
CALCIUM SERPL-MCNC: 9.1 MG/DL (ref 8.5–10.1)
CHLORIDE SERPL-SCNC: 104 MMOL/L (ref 100–111)
CO2 SERPL-SCNC: 23 MMOL/L (ref 21–32)
CREAT SERPL-MCNC: 0.91 MG/DL (ref 0.6–1.3)
D DIMER PPP FEU-MCNC: 3.65 UG/ML(FEU)
DEPRECATED S PYO AG THROAT QL EIA: NEGATIVE
DIFFERENTIAL METHOD BLD: ABNORMAL
EOSINOPHIL # BLD: 0 K/UL (ref 0–0.4)
EOSINOPHIL NFR BLD: 0 % (ref 0–5)
ERYTHROCYTE [DISTWIDTH] IN BLOOD BY AUTOMATED COUNT: 12.1 % (ref 11.6–14.5)
GLOBULIN SER CALC-MCNC: 4 G/DL (ref 2–4)
GLUCOSE SERPL-MCNC: 138 MG/DL (ref 74–99)
HCT VFR BLD AUTO: 28.3 % (ref 36–48)
HGB BLD-MCNC: 9.8 G/DL (ref 13–16)
IMM GRANULOCYTES # BLD AUTO: 0 K/UL (ref 0–0.04)
IMM GRANULOCYTES NFR BLD AUTO: 0 % (ref 0–0.5)
LYMPHOCYTES # BLD: 1.3 K/UL (ref 0.9–3.6)
LYMPHOCYTES NFR BLD: 20 % (ref 21–52)
MAGNESIUM SERPL-MCNC: 2.1 MG/DL (ref 1.6–2.6)
MCH RBC QN AUTO: 30.8 PG (ref 24–34)
MCHC RBC AUTO-ENTMCNC: 34.6 G/DL (ref 31–37)
MCV RBC AUTO: 89 FL (ref 78–100)
MONOCYTES # BLD: 0.5 K/UL (ref 0.05–1.2)
MONOCYTES NFR BLD: 8 % (ref 3–10)
NEUTS SEG # BLD: 4.8 K/UL (ref 1.8–8)
NEUTS SEG NFR BLD: 72 % (ref 40–73)
NRBC # BLD: 0 K/UL (ref 0–0.01)
NRBC BLD-RTO: 0 PER 100 WBC
NT PRO BNP: 47 PG/ML (ref 0–900)
PLATELET # BLD AUTO: 241 K/UL (ref 135–420)
PMV BLD AUTO: 9.7 FL (ref 9.2–11.8)
POTASSIUM SERPL-SCNC: 3.5 MMOL/L (ref 3.5–5.5)
PROT SERPL-MCNC: 7.2 G/DL (ref 6.4–8.2)
RBC # BLD AUTO: 3.18 M/UL (ref 4.35–5.65)
SODIUM SERPL-SCNC: 138 MMOL/L (ref 136–145)
TROPONIN I SERPL HS-MCNC: 6 NG/L (ref 0–78)
WBC # BLD AUTO: 6.7 K/UL (ref 4.6–13.2)

## 2024-06-23 PROCEDURE — 84484 ASSAY OF TROPONIN QUANT: CPT

## 2024-06-23 PROCEDURE — 83880 ASSAY OF NATRIURETIC PEPTIDE: CPT

## 2024-06-23 PROCEDURE — 6360000002 HC RX W HCPCS: Performed by: STUDENT IN AN ORGANIZED HEALTH CARE EDUCATION/TRAINING PROGRAM

## 2024-06-23 PROCEDURE — 71045 X-RAY EXAM CHEST 1 VIEW: CPT

## 2024-06-23 PROCEDURE — 71275 CT ANGIOGRAPHY CHEST: CPT

## 2024-06-23 PROCEDURE — 85025 COMPLETE CBC W/AUTO DIFF WBC: CPT

## 2024-06-23 PROCEDURE — 96374 THER/PROPH/DIAG INJ IV PUSH: CPT

## 2024-06-23 PROCEDURE — 87070 CULTURE OTHR SPECIMN AEROBIC: CPT

## 2024-06-23 PROCEDURE — 6360000004 HC RX CONTRAST MEDICATION: Performed by: STUDENT IN AN ORGANIZED HEALTH CARE EDUCATION/TRAINING PROGRAM

## 2024-06-23 PROCEDURE — 87880 STREP A ASSAY W/OPTIC: CPT

## 2024-06-23 PROCEDURE — 93005 ELECTROCARDIOGRAM TRACING: CPT | Performed by: STUDENT IN AN ORGANIZED HEALTH CARE EDUCATION/TRAINING PROGRAM

## 2024-06-23 PROCEDURE — 80053 COMPREHEN METABOLIC PANEL: CPT

## 2024-06-23 PROCEDURE — 83735 ASSAY OF MAGNESIUM: CPT

## 2024-06-23 PROCEDURE — 2580000003 HC RX 258: Performed by: STUDENT IN AN ORGANIZED HEALTH CARE EDUCATION/TRAINING PROGRAM

## 2024-06-23 PROCEDURE — 85379 FIBRIN DEGRADATION QUANT: CPT

## 2024-06-23 PROCEDURE — 99285 EMERGENCY DEPT VISIT HI MDM: CPT

## 2024-06-23 RX ORDER — METOCLOPRAMIDE HYDROCHLORIDE 5 MG/ML
10 INJECTION INTRAMUSCULAR; INTRAVENOUS
Status: COMPLETED | OUTPATIENT
Start: 2024-06-23 | End: 2024-06-23

## 2024-06-23 RX ORDER — 0.9 % SODIUM CHLORIDE 0.9 %
1000 INTRAVENOUS SOLUTION INTRAVENOUS ONCE
Status: COMPLETED | OUTPATIENT
Start: 2024-06-23 | End: 2024-06-23

## 2024-06-23 RX ADMIN — IOPAMIDOL 100 ML: 755 INJECTION, SOLUTION INTRAVENOUS at 20:07

## 2024-06-23 RX ADMIN — METOCLOPRAMIDE HYDROCHLORIDE 10 MG: 5 INJECTION INTRAMUSCULAR; INTRAVENOUS at 19:25

## 2024-06-23 RX ADMIN — SODIUM CHLORIDE 1000 ML: 9 INJECTION, SOLUTION INTRAVENOUS at 21:59

## 2024-06-23 ASSESSMENT — PAIN - FUNCTIONAL ASSESSMENT: PAIN_FUNCTIONAL_ASSESSMENT: 0-10

## 2024-06-23 ASSESSMENT — PAIN DESCRIPTION - LOCATION: LOCATION: KNEE

## 2024-06-23 ASSESSMENT — PAIN DESCRIPTION - ORIENTATION: ORIENTATION: RIGHT

## 2024-06-23 NOTE — ED TRIAGE NOTES
Patient arrives to ED via [facility] with c/o SOB and right knee pain secondary to knee replacement on 6/18/24.  Patient states that he had one BM of diarrhea today, that was after not having a bowel movement since Monday.  Patient also states he vomited twice today.  Patient is A&OX4 and has been placed on full cardiac monitor.

## 2024-06-23 NOTE — ED NOTES
Bedside shift change report given to SANDOVAL Petty (oncoming nurse) by SANDOVAL Crespo (offgoing nurse). Report included the following information Nurse Handoff Report.

## 2024-06-23 NOTE — ED PROVIDER NOTES
Vaping Use    Vaping Use: Never used   Substance Use Topics    Alcohol use: No    Drug use: No       Allergies:  No Known Allergies      Review of Systems     All pertinent positives and negatives in the HPI     Review of Systems      Physical Exam   /78   Pulse 94   Temp 98 °F (36.7 °C) (Oral)   Resp 22   Ht 1.854 m (6' 1\")   Wt 98.9 kg (218 lb)   SpO2 98%   BMI 28.76 kg/m²       Physical Exam  Constitutional:       Appearance: Normal appearance. He is not toxic-appearing.   HENT:      Head: Normocephalic and atraumatic.   Eyes:      Extraocular Movements: Extraocular movements intact.      Conjunctiva/sclera: Conjunctivae normal.      Pupils: Pupils are equal, round, and reactive to light.   Cardiovascular:      Rate and Rhythm: Normal rate.   Pulmonary:      Effort: Pulmonary effort is normal. No respiratory distress.      Breath sounds: Normal breath sounds. No stridor. No wheezing or rhonchi.   Abdominal:      General: Abdomen is flat. Bowel sounds are normal.      Palpations: Abdomen is soft.   Musculoskeletal:         General: Normal range of motion.      Cervical back: Normal range of motion and neck supple.      Comments: Right lower knee shows no signs of erythema, swelling, pus, drainage.,  2+ DP pulse, less than 2 seconds capillary refill.  Compartments soft.   Skin:     General: Skin is warm.      Capillary Refill: Capillary refill takes less than 2 seconds.   Neurological:      General: No focal deficit present.      Mental Status: He is alert. Mental status is at baseline.           Diagnostic Study Results     Labs -  No results found for this or any previous visit (from the past 12 hour(s)).    Radiologic Studies -   CTA CHEST W WO CONTRAST PE Eval   Final Result      1. No pulmonary embolism.   2. No acute thoracic/pulmonary disease            Electronically signed by Betito VALENTIN CHEST PORTABLE   Final Result      1. Hyperinflation with no acute pulmonary disease

## 2024-06-24 LAB
EKG ATRIAL RATE: 111 BPM
EKG DIAGNOSIS: NORMAL
EKG P AXIS: 52 DEGREES
EKG P-R INTERVAL: 136 MS
EKG Q-T INTERVAL: 340 MS
EKG QRS DURATION: 84 MS
EKG QTC CALCULATION (BAZETT): 462 MS
EKG R AXIS: 68 DEGREES
EKG T AXIS: 67 DEGREES
EKG VENTRICULAR RATE: 111 BPM

## 2024-06-24 PROCEDURE — 93010 ELECTROCARDIOGRAM REPORT: CPT | Performed by: INTERNAL MEDICINE

## 2024-06-24 RX ORDER — METOCLOPRAMIDE 10 MG/1
10 TABLET ORAL 4 TIMES DAILY
Qty: 120 TABLET | Refills: 0 | Status: SHIPPED | OUTPATIENT
Start: 2024-06-24 | End: 2024-06-24

## 2024-06-24 RX ORDER — METOCLOPRAMIDE 10 MG/1
10 TABLET ORAL 4 TIMES DAILY
Qty: 28 TABLET | Refills: 0 | Status: SHIPPED | OUTPATIENT
Start: 2024-06-24 | End: 2024-07-01

## 2024-06-24 NOTE — ED NOTES
Assumed care of pt. Pt A+Ox4. Pt has surgery on R knee of Tuesday. Pt has been having increased SOB and hiccups. Upon receiving report pt began gasping for air for approx 30 seconds x2. MD Sandoval brought to bedside. Medications ordered.

## 2024-06-24 NOTE — DISCHARGE INSTRUCTIONS
You were evaluated for shorntess of breath and hiccups .  Based on your work-up it was deemed that she was stable for discharge.  Please  your medication of reglan which was prescribed to you.  Please follow-up with your primary care physician if you have any further concerns and go over your work-up.  If you experience any chest pain, shortness of breath, worsening abdominal pain, vomiting blood, worsening headache, seizures, or any worsening of your symptoms please return to the emergency department immediately.  If you have any pending results or any further questions please contact the emergency department at [phone].

## 2024-06-26 LAB
BACTERIA SPEC CULT: NORMAL
SERVICE CMNT-IMP: NORMAL

## 2024-06-28 ENCOUNTER — TELEPHONE (OUTPATIENT)
Age: 69
End: 2024-06-28

## 2024-06-28 DIAGNOSIS — Z96.651 S/P TKR (TOTAL KNEE REPLACEMENT), RIGHT: Primary | ICD-10-CM

## 2024-06-28 NOTE — TELEPHONE ENCOUNTER
Patient called to ask Dr. Anderson if he can have a referral for more Home Health Care and he stated that he'll [facility] to let them know.    Patient can be reached at [phone].

## 2024-06-28 NOTE — TELEPHONE ENCOUNTER
Spoke with pt--[facility] approved [facility]--new order written--faxed to [facility] fax # [phone]

## 2024-07-02 ENCOUNTER — OFFICE VISIT (OUTPATIENT)
Age: 69
End: 2024-07-02

## 2024-07-02 VITALS — BODY MASS INDEX: 29.03 KG/M2 | TEMPERATURE: 97.5 F | HEIGHT: 73 IN | WEIGHT: 219 LBS

## 2024-07-02 DIAGNOSIS — G89.18 ACUTE POST-OPERATIVE PAIN: ICD-10-CM

## 2024-07-02 PROCEDURE — 99024 POSTOP FOLLOW-UP VISIT: CPT | Performed by: PHYSICIAN ASSISTANT

## 2024-07-02 RX ORDER — OXYCODONE HYDROCHLORIDE 5 MG/1
5 TABLET ORAL EVERY 6 HOURS PRN
Qty: 28 TABLET | Refills: 0 | Status: SHIPPED | OUTPATIENT
Start: 2024-07-02 | End: 2024-07-09

## 2024-07-02 NOTE — PROGRESS NOTES
Mode Cabrales returns to the office 2 weeks status post his primary right total knee replacement.  Is doing well today.  He is continuing home physical therapy.n he does walk with the assistance of a 4 post rolling walker and alternates use of a wheelchair.  He requested pain medication refilled today.  He is also continuing his aspirin for thromboembolism prophylaxis.    Right anterior knee reveals a craniocaudal 28 cm surgical incision measuring [id number].  Surgical staples are present.  Skin wound borders well-approximated.  Dried bloody drainage noted throughout.  Over the center portion of the surgical site medially measuring 4 cm craniocaudal by 2 cm anterior posterior there is a area of bruising with underlying evidence of possible hematoma/seroma.  Fluctuance is noted.  No chelsea indurations noted.    Active motion guarded today at bedside 70 degrees -15 degrees.    No calf tenderness or evidence of DVT right lower extremity.    Procedural: Using clean technique all staples today removed with no complications.  Steri-Strips were placed with sterile top-cover.    Plan: Patient will continue in-home therapy for an additional week.  He will then advance to outpatient services.  His pain medication was refilled and he will continue to taper.  Will see him back in about 2 to 3 weeks for x-ray.  He may get the surgical site wet but avoid any prolonged soaking for at least 2 to 3 days.  All questions answered to his satisfaction today.

## 2024-07-30 ENCOUNTER — OFFICE VISIT (OUTPATIENT)
Age: 69
End: 2024-07-30
Payer: OTHER GOVERNMENT

## 2024-07-30 VITALS — WEIGHT: 207 LBS | TEMPERATURE: 97.8 F | BODY MASS INDEX: 27.43 KG/M2 | HEIGHT: 73 IN

## 2024-07-30 DIAGNOSIS — R52 ACUTE PAIN: ICD-10-CM

## 2024-07-30 DIAGNOSIS — Z96.651 S/P TKR (TOTAL KNEE REPLACEMENT), RIGHT: Primary | ICD-10-CM

## 2024-07-30 PROCEDURE — 73560 X-RAY EXAM OF KNEE 1 OR 2: CPT | Performed by: PHYSICIAN ASSISTANT

## 2024-07-30 PROCEDURE — 99024 POSTOP FOLLOW-UP VISIT: CPT | Performed by: PHYSICIAN ASSISTANT

## 2024-07-30 RX ORDER — OXYCODONE HYDROCHLORIDE AND ACETAMINOPHEN 5; 325 MG/1; MG/1
1 TABLET ORAL EVERY 8 HOURS PRN
Qty: 21 TABLET | Refills: 0 | Status: SHIPPED | OUTPATIENT
Start: 2024-07-30 | End: 2024-08-06

## 2024-07-30 RX ORDER — BISACODYL 5 MG/1
5 TABLET, DELAYED RELEASE ORAL DAILY PRN
Qty: 30 TABLET | Refills: 0 | Status: SHIPPED | OUTPATIENT
Start: 2024-07-30

## 2024-07-30 NOTE — PROGRESS NOTES
Mode Cabrales returns to the office  weeks status post his primary right total knee replacement.  Is doing well today.  He is continuing home physical therapy.n he does walk with the assistance of a single post cane.  He requested pain medication refilled today.  He is also continuing his aspirin for thromboembolism prophylaxis.    Right anterior knee reveals a craniocaudal 28 cm surgical incision measuring [id number].   Skin wound borders well-approximated..    Active motion guarded today at bedside 95 degrees -5 degrees.    No calf tenderness or evidence of DVT right lower extremity.    X-ray: [facility] 7/30/2024 space right knee AP and lateral total joint prosthesis intact with  2 view of the anatomical alignment no evidence of periprosthetic fracturing or dislocation.    Plan: Patient will begin outpatient physical therapy services at [facility] in motion.  Focus on terminal flexion extension with a goal of full extension and 115 degrees of flexion.  Pain medication as well as his stool softener laxative was refilled today.  X-rays reviewed copy provided.  Follow-up in 1 months.

## 2024-08-09 ENCOUNTER — HOSPITAL ENCOUNTER (OUTPATIENT)
Facility: HOSPITAL | Age: 69
Setting detail: RECURRING SERIES
Discharge: HOME | End: 2024-08-12
Payer: OTHER GOVERNMENT

## 2024-08-09 PROCEDURE — 97535 SELF CARE MNGMENT TRAINING: CPT

## 2024-08-09 PROCEDURE — 97161 PT EVAL LOW COMPLEX 20 MIN: CPT

## 2024-08-09 NOTE — PROGRESS NOTES
PHYSICAL / OCCUPATIONAL THERAPY - DAILY TREATMENT NOTE    Patient Name: Mode Griffin    Date: 2024    : 1955  Insurance: Payor: [employer] / Plan: [employer] / Product Type: *No Product type* /      Patient  verified Yes     Visit #   Current / Total 1 10   Time   In / Out 850 930   Pain   In / Out 3 3   Subjective Functional Status/Changes: See POC     TREATMENT AREA =  Pain in right knee [M25.561]  S/P TKR (total knee replacement), right [Z96.651]    OBJECTIVE    30 min [x]Eval - untimed                             Therapeutic Procedures:    Tx Min Billable or 1:1 Min (if diff from Tx Min) Procedure, Rationale, Specifics   10 10 [id number] Self Care/Home Management (timed):  improve patient knowledge and understanding of pain reducing techniques, positioning, posture/ergonomics, home safety, activity modification, diagnosis/prognosis, and physical therapy expectations, procedures and progression  to improve patient's ability to progress to PLOF and address remaining functional goals.  (see flow sheet as applicable)     Details if applicable:  HEP instruction, Pt education on rehab timeline and biomechanics      10 10 [facility] Totals Reminder: bill using total billable min of TIMED therapeutic procedures (example: do not include dry needle or estim unattended, both untimed codes, in totals to left)  8-22 min = 1 unit; 23-37 min = 2 units; 38-52 min = 3 units; 53-67 min = 4 units; 68-82 min = 5 units   Total Total     [x]  Patient Education billed concurrently with other procedures   [x] Review HEP    [] Progressed/Changed HEP, detail:    [] Other detail:       Objective Information/Functional Measures/Assessment    See POC    Patient will continue to benefit from skilled PT / OT services to modify and progress therapeutic interventions, analyze and address functional mobility deficits, analyze and address ROM deficits, analyze and address strength deficits, analyze and address soft tissue restrictions,

## 2024-08-09 NOTE — PROGRESS NOTES
[facility] [address]  [address]  [place] [address]  [phone] fax    Plan of Care / Statement of Necessity for Physical Therapy Services     Patient Name: Mode Griffin : 1955   Medical   Diagnosis: Pain in right knee [M25.561]  S/P TKR (total knee replacement), right [Z96.651] Treatment Diagnosis: M25.561  RIGHT KNEE PAIN       Onset Date: DOS: 24 Payor :  Payor: [employer] / Plan: [employer] / Product Type: *No Product type* /    Referral Source: Rafael Quezada PA-C Start of Care (SOC): 2024   Prior Hospitalization: See medical history Provider #: [id number]   Prior Level of Function:  Functionally independent, intermittent use of AD prior to surgery   Comorbidities:   Hx of lower back pain, HTN, Hx of prostate Cancer, Hx of angina ( but no recent episodes since), Right TKA 24 prior knee scope.      Subjective Info:     SOFIE: Had been dealing with right knee pain for a while prior to replacement on DOS: 24.   Current Symptoms: Reporting front of the knee discomfort; states that pain is more achy than sharp. Notes knee buckling during ambulation, denies any pain in the calf. Has swelling but is going down.   Current Current Pain: 3/10     Best Pain:  3/10     Worst Pain:  4/10  Constant/Intermittent: Constant  Progression since onset?: Progressing  Current Mobility: Ambulation with SPC  Aggravating/Relieving factors: Prolnoged walking/standing can aggravate pain particularly at the end of the day; Tylenol has helped with pain. Ice helped but has not performed since finishing home health  Previous Treatment: home-health PT  Self Care: Sleep has been better (4-5 hours of  sleep), denies waking up due to knee pain. Side sleeper  Activity/Participation Limitations: Difficulty with stair negotiation while holding objects, prolonged standing/walking, difficulty with yard work; home-management chores affected by current knee function.

## 2024-08-14 ENCOUNTER — HOSPITAL ENCOUNTER (OUTPATIENT)
Facility: HOSPITAL | Age: 69
Setting detail: RECURRING SERIES
Discharge: HOME | End: 2024-08-17
Payer: OTHER GOVERNMENT

## 2024-08-14 PROCEDURE — 97112 NEUROMUSCULAR REEDUCATION: CPT

## 2024-08-14 PROCEDURE — 97110 THERAPEUTIC EXERCISES: CPT

## 2024-08-14 PROCEDURE — 97530 THERAPEUTIC ACTIVITIES: CPT

## 2024-08-14 PROCEDURE — 97140 MANUAL THERAPY 1/> REGIONS: CPT

## 2024-08-14 NOTE — PROGRESS NOTES
PHYSICAL / OCCUPATIONAL THERAPY - DAILY TREATMENT NOTE    Patient Name: Mode Griffin    Date: 2024    : 1955  Insurance: Payor: [employer] / Plan: [employer]UM / Product Type: *No Product type* /      Patient  verified Yes     Visit #   Current / Total 2 10   Time   In / Out 853 945   Pain   In / Out 3 3   Subjective Functional Status/Changes: \"I the knee feels not too bad today\"  Pt reports compliance with HEP.      TREATMENT AREA =  Pain in right knee [M25.561]  S/P TKR (total knee replacement), right [Z96.651]     OBJECTIVE    Modalities Rationale:     decrease edema, decrease inflammation, and decrease pain to improve patient's ability to progress to PLOF and address remaining functional goals.     min [] Estim Unattended, type/location:                                      []  w/ice    []  w/heat    min [] Estim Attended, type/location:                                     []  w/US     []  w/ice    []  w/heat    []  TENS insruct      min []  Mechanical Traction: type/lbs                   []  pro   []  sup   []  int   []  cont    []  before manual    []  after manual    min []  Ultrasound, settings/location:     10 min  unbill [x]  Ice     []  Heat    location/position: Right anterior knee with legs on blolster    min []  Paraffin,  details:     min []  Vasopneumatic Device, press/temp:     min []  Whirlpool / Fluido:    If using vaso (only need to measure limb vaso being performed on)      pre-treatment girth :       post-treatment girth :       measured at (landmark location) :      min []  Other:    Skin assessment post-treatment:   Intact      Therapeutic Procedures:    Tx Min Billable or 1:1 Min (if diff from Tx Min) Procedure, Rationale, Specifics   10  66609 Therapeutic Exercise (timed):  increase ROM, strength, coordination, balance, and proprioception to improve patient's ability to progress to PLOF and address remaining functional goals. (see flow sheet as applicable)     Details if

## 2024-08-16 ENCOUNTER — HOSPITAL ENCOUNTER (OUTPATIENT)
Facility: HOSPITAL | Age: 69
Setting detail: RECURRING SERIES
Discharge: HOME | End: 2024-08-19
Payer: OTHER GOVERNMENT

## 2024-08-16 PROCEDURE — 97112 NEUROMUSCULAR REEDUCATION: CPT

## 2024-08-16 PROCEDURE — 97110 THERAPEUTIC EXERCISES: CPT

## 2024-08-16 PROCEDURE — 97530 THERAPEUTIC ACTIVITIES: CPT

## 2024-08-16 PROCEDURE — 97535 SELF CARE MNGMENT TRAINING: CPT

## 2024-08-16 NOTE — PROGRESS NOTES
procedures and progression  to improve patient's ability to progress to PLOF and address remaining functional goals.  (see flow sheet as applicable)     Details if applicable:  pt ed   38  [facility] Totals Reminder: bill using total billable min of TIMED therapeutic procedures (example: do not include dry needle or estim unattended, both untimed codes, in totals to left)  8-22 min = 1 unit; 23-37 min = 2 units; 38-52 min = 3 units; 53-67 min = 4 units; 68-82 min = 5 units   Total Total     [x]  Patient Education billed concurrently with other procedures   [x] Review HEP    [] Progressed/Changed HEP, detail:    [] Other detail:       Objective Information/Functional Measures/Assessment    Patient participated in today's session without adverse effect. Continued limitations in knee flex/ext ROM, but steadily progressing. Emphasis on ROM primarily this visit. Performed x10 STS from plinth without UE. Good tolerance to all exercises; progress as able. Assess mini squat NV.     Patient will continue to benefit from skilled PT / OT services to modify and progress therapeutic interventions, analyze and address functional mobility deficits, analyze and address ROM deficits, analyze and address strength deficits, analyze and address soft tissue restrictions, analyze and cue for proper movement patterns, analyze and modify for postural abnormalities, analyze and address imbalance/dizziness, and instruct in home and community integration to address functional deficits and attain remaining goals.    Progress toward goals / Updated goals:  []  See Progress Note/Recertification    Short Term Goals: To be accomplished in 2 weeks     Pt will be able to report a 2/10 pain rating at worst to improve patient's ability to tolerate prolonged functional activities at home.               Eval:4/10 at worst  Current 3/10 pain, 8/14/2024     Pt will be independent with HEP to facilitate carry-over of functional gains made in PT at home &

## 2024-08-19 ENCOUNTER — HOSPITAL ENCOUNTER (OUTPATIENT)
Facility: HOSPITAL | Age: 69
Setting detail: RECURRING SERIES
Discharge: HOME | End: 2024-08-22
Payer: OTHER GOVERNMENT

## 2024-08-19 PROCEDURE — 97535 SELF CARE MNGMENT TRAINING: CPT

## 2024-08-19 PROCEDURE — 97112 NEUROMUSCULAR REEDUCATION: CPT

## 2024-08-19 PROCEDURE — 97530 THERAPEUTIC ACTIVITIES: CPT

## 2024-08-19 PROCEDURE — 97110 THERAPEUTIC EXERCISES: CPT

## 2024-08-19 NOTE — PROGRESS NOTES
return to performing yardwork.               Eval: not formally assessed, check NV.    Initiated squatting today with excessive forward lean and genu valgus [Date assessed: 08/19/2024]    Next PN/ RC due 09/07/2024  Auth due (visit number/ date) 11 remaining by 09/24/2024    PLAN  - Continue Plan of Care    Carlos Rowley PTA, [facility]    8/19/2024    8:55 AM  If an interpreting service was utilized for treatment of this patient, the contents of this document represent the material reviewed with the patient via the .     Future Appointments   Date Time Provider Department Center   8/22/2024  8:10 AM Jacques Rooney, PT [facility]   8/27/2024  8:50 AM Evelyn Pickard PTA [facility]   8/30/2024  8:10 AM Rochelle Powers, PT [facility]   8/30/2024  9:15 AM Rafael Quezada, PA-C [facility]

## 2024-08-22 ENCOUNTER — HOSPITAL ENCOUNTER (OUTPATIENT)
Facility: HOSPITAL | Age: 69
Setting detail: RECURRING SERIES
Discharge: HOME | End: 2024-08-25
Payer: OTHER GOVERNMENT

## 2024-08-22 PROCEDURE — 97140 MANUAL THERAPY 1/> REGIONS: CPT

## 2024-08-22 PROCEDURE — 97530 THERAPEUTIC ACTIVITIES: CPT

## 2024-08-22 PROCEDURE — 97110 THERAPEUTIC EXERCISES: CPT

## 2024-08-22 PROCEDURE — 97535 SELF CARE MNGMENT TRAINING: CPT

## 2024-08-22 NOTE — PROGRESS NOTES
diagnosis/prognosis, and physical therapy expectations, procedures and progression  to improve patient's ability to progress to PLOF and address remaining functional goals.  (see flow sheet as applicable)     Details if applicable:  Pt education on anatomy review, arthrokinematic motions   45 45 [facility] Totals Reminder: bill using total billable min of TIMED therapeutic procedures (example: do not include dry needle or estim unattended, both untimed codes, in totals to left)  8-22 min = 1 unit; 23-37 min = 2 units; 38-52 min = 3 units; 53-67 min = 4 units; 68-82 min = 5 units   Total Total     [x]  Patient Education billed concurrently with other procedures   [x] Review HEP    [] Progressed/Changed HEP, detail:    [] Other detail:       Objective Information/Functional Measures/Assessment    Pt tolerating exercises well, noting fatigue during standing strenghtening exercises. Exercises emphasized improved knee AROM during today session. He demonstrates persistent hypomobility at patella into all planes coupled with decreased arthrokinematic translation into flexion/extension impeding his mobility. Knee flexion still at 104 degrees with heel slide, performing posterior tibial mob with patient able to achieve 104 deg without heel slide strap; knee extension continues to be decreased. Provided HEP for alternative exercises to work on knee flexion and extension range. Progress as able.     Patient will continue to benefit from skilled PT / OT services to modify and progress therapeutic interventions, analyze and address functional mobility deficits, analyze and address ROM deficits, analyze and address strength deficits, analyze and address soft tissue restrictions, analyze and cue for proper movement patterns, analyze and modify for postural abnormalities, analyze and address imbalance/dizziness, and instruct in home and community integration to address functional deficits and attain remaining goals.    Progress toward  goals / Updated goals:  []  See Progress Note/Recertification    Short Term Goals: To be accomplished in 2 weeks     Pt will be able to report a 2/10 pain rating at worst to improve patient's ability to tolerate prolonged functional activities at home.               Eval:4/10 at worst  Current 3/10 pain, 8/14/2024     Pt will be independent with HEP to facilitate carry-over of functional gains made in PT at home & community.               Eval: Established at [facility]  Reports inconsistent compliance [Date assessed: 08/19/2024]     3. Pt will be able to perform 5 sit to stands without UE support to improve his ability to get out of chairs with no arms in community.               Eval: unable to perform 1 rep               Current: 10x without UE from elevated plinth (08/16/24)      4. Pt will improve right knee extension AROM to at least lacking 10 degrees to improve their ability to perform proper heel strike during ambulation at home.  Eval: lacking 15 degrees   Lacking 13 deg today (08/16/24)      Long Term Goals: To be accomplished in 5 weeks  Pt will be able to improve strength in right Hip flexion, knee flexion, knee extension to at least 5/5 to improve patient's ability to negotiate stairs at home & community.               Eval: Hip Flexion: 4/5, Knee Flexion: 4/5, Knee Ext: 4+/5               Making steady progress [Date assessed: 08/19/2024]     2. Pt will improve LEFS (functional outcome name) score to 39 to demonstrate improvement in patient's ability to perform unrestricted ADLs/IADLs at home & community.              Eval: 30 points               Assess at 30 day amara [Date assessed: 08/19/2024]     3. Pt will report compliance with home HEP at end of care to enhance carry over of fuctional gains made with skilled therapy services in order to improve quality of life.               Eval: Established first HEP at [facility]               Reports inconsistent compliance [Date assessed: 08/19/2024]     4. Pt will

## 2024-08-27 ENCOUNTER — HOSPITAL ENCOUNTER (OUTPATIENT)
Facility: HOSPITAL | Age: 69
Setting detail: RECURRING SERIES
Discharge: HOME | End: 2024-08-30
Payer: OTHER GOVERNMENT

## 2024-08-27 PROCEDURE — 97110 THERAPEUTIC EXERCISES: CPT

## 2024-08-27 PROCEDURE — 97535 SELF CARE MNGMENT TRAINING: CPT

## 2024-08-27 PROCEDURE — 97140 MANUAL THERAPY 1/> REGIONS: CPT

## 2024-08-27 PROCEDURE — 97112 NEUROMUSCULAR REEDUCATION: CPT

## 2024-08-27 NOTE — PROGRESS NOTES
PHYSICAL / OCCUPATIONAL THERAPY - DAILY TREATMENT NOTE     Patient Name: Mode Griffin    Date: 2024    : 1955  Insurance: Payor: [employer] / Plan: [employer] / Product Type: *No Product type* /      Patient  verified Yes     Visit #   Current / Total 6 10   Time   In / Out 852 935   Pain   In / Out 3-4 3   Subjective Functional Status/Changes: Pt reported always more stiff in the morning but pain continues to improve.   Changes to:  Allergies, Med Hx, Sx Hx?   no       TREATMENT AREA =  Pain in right knee [M25.561]  S/P TKR (total knee replacement), right [Z96.651]    If an interpreting service is utilized for treatment of this patient, the contents of this document represent the material reviewed with the patient via the .     OBJECTIVE  Therapeutic Procedures:  Tx Min Billable or 1:1 Min (if diff from Tx Min) Procedure, Rationale, Specifics   13  92620 Therapeutic Exercise (timed):  increase ROM, strength, coordination, balance, and proprioception to improve patient's ability to progress to PLOF and address remaining functional goals. (see flow sheet as applicable)    Details if applicable:       10  31976 Neuromuscular Re-Education (timed):  improve balance, coordination, kinesthetic sense, posture, core stability and proprioception to improve patient's ability to develop conscious control of individual muscles and awareness of position of extremities in order to progress to PLOF and address remaining functional goals. (see flow sheet as applicable)    Details if applicable:     10  23309 Manual Therapy (timed):  decrease pain, increase ROM, and increase tissue extensibility to improve patient's ability to progress to PLOF and address remaining functional goals.  The manual therapy interventions were performed at a separate and distinct time from the therapeutic activities interventions . Details: Posterior tibial glide Grade IV for flexion, posterior distal femur glide for extension

## 2024-08-30 ENCOUNTER — OFFICE VISIT (OUTPATIENT)
Age: 69
End: 2024-08-30

## 2024-08-30 ENCOUNTER — HOSPITAL ENCOUNTER (OUTPATIENT)
Facility: HOSPITAL | Age: 69
Setting detail: RECURRING SERIES
End: 2024-08-30
Payer: OTHER GOVERNMENT

## 2024-08-30 VITALS — HEIGHT: 73 IN | BODY MASS INDEX: 27.83 KG/M2 | TEMPERATURE: 97.8 F | WEIGHT: 210 LBS

## 2024-08-30 DIAGNOSIS — Z96.651 S/P TKR (TOTAL KNEE REPLACEMENT), RIGHT: Primary | ICD-10-CM

## 2024-08-30 PROCEDURE — 97140 MANUAL THERAPY 1/> REGIONS: CPT

## 2024-08-30 PROCEDURE — 99024 POSTOP FOLLOW-UP VISIT: CPT | Performed by: PHYSICIAN ASSISTANT

## 2024-08-30 PROCEDURE — 97112 NEUROMUSCULAR REEDUCATION: CPT

## 2024-08-30 PROCEDURE — 97530 THERAPEUTIC ACTIVITIES: CPT

## 2024-08-30 PROCEDURE — 97110 THERAPEUTIC EXERCISES: CPT

## 2024-08-30 NOTE — PROGRESS NOTES
Mode Cabrales returns to the office  11 weeks status post his primary right total knee replacement.  Is doing well today.  He is continuing home physical therapy.n he does walk with the assistance of a single post cane.  He requested pain medication refilled today.  He is also continuing his aspirin for thromboembolism prophylaxis.    Right anterior knee reveals a craniocaudal 28 cm surgical incision measuring [id number].   Skin wound borders well-approximated..    Active motion guarded today at bedside 95 degrees -5 degrees.    No calf tenderness or evidence of DVT right lower extremity.    X-ray: [facility] 7/30/2024 space right knee AP and lateral total joint prosthesis intact with  2 view of the anatomical alignment no evidence of periprosthetic fracturing or dislocation.    Plan: Patient will begin outpatient physical therapy services at [facility] in [facility].  Focus on terminal flexion extension with a goal of full extension and 115 degrees of flexion.  Pain medication as well as his stool softener laxative was refilled today.  X-rays reviewed copy provided.  Follow-up in 1 months.

## 2024-08-30 NOTE — PROGRESS NOTES
PHYSICAL / OCCUPATIONAL THERAPY - DAILY TREATMENT NOTE    Patient Name: Mode Griffin    Date: 2024    : 1955  Insurance: Payor: [employer] OPTUM / Plan: [employer] OPTUM / Product Type: *No Product type* /      Patient  verified Yes     Visit #   Current / Total 7 10   Time   In / Out 0811 0850   Pain   In / Out 2-3/10 0/10   Subjective Functional Status/Changes: Patient reports continued numbness and stiffness in R knee.      TREATMENT AREA =  Pain in right knee [M25.561]  S/P TKR (total knee replacement), right [Z96.651]     OBJECTIVE      Therapeutic Procedures:    Tx Min Billable or 1:1 Min (if diff from Tx Min) Procedure, Rationale, Specifics   13  21505 Therapeutic Exercise (timed):  increase ROM, strength, coordination, balance, and proprioception to improve patient's ability to progress to PLOF and address remaining functional goals. (see flow sheet as applicable)     Details if applicable:       9  28903 Neuromuscular Re-Education (timed):  improve balance, coordination, kinesthetic sense, posture, core stability and proprioception to improve patient's ability to develop conscious control of individual muscles and awareness of position of extremities in order to progress to PLOF and address remaining functional goals. (see flow sheet as applicable)     Details if applicable:  quad re-ed, balance   9  75985 Therapeutic Activity (timed):  use of dynamic activities replicating functional movements to increase ROM, strength, coordination, balance, and proprioception in order to improve patient's ability to progress to PLOF and address remaining functional goals.  (see flow sheet as applicable)     Details if applicable:  step up, squatting    8  18926 Manual Therapy (timed):  increase ROM and increase tissue extensibility to improve patient's ability to progress to PLOF and address remaining functional goals.  The manual therapy interventions were performed at a separate and distinct time from the

## 2024-09-04 ENCOUNTER — OFFICE VISIT (OUTPATIENT)
Age: 69
End: 2024-09-04
Payer: OTHER GOVERNMENT

## 2024-09-04 ENCOUNTER — HOSPITAL ENCOUNTER (OUTPATIENT)
Facility: HOSPITAL | Age: 69
Setting detail: RECURRING SERIES
Discharge: HOME | End: 2024-09-07
Payer: OTHER GOVERNMENT

## 2024-09-04 VITALS — TEMPERATURE: 98.6 F | WEIGHT: 212 LBS | BODY MASS INDEX: 28.1 KG/M2 | HEIGHT: 73 IN

## 2024-09-04 DIAGNOSIS — Z96.651 S/P TKR (TOTAL KNEE REPLACEMENT), RIGHT: ICD-10-CM

## 2024-09-04 DIAGNOSIS — M25.662 DECREASED RANGE OF MOTION OF LEFT KNEE: ICD-10-CM

## 2024-09-04 DIAGNOSIS — G89.29 CHRONIC PAIN OF LEFT KNEE: Primary | ICD-10-CM

## 2024-09-04 DIAGNOSIS — M25.562 CHRONIC PAIN OF LEFT KNEE: Primary | ICD-10-CM

## 2024-09-04 DIAGNOSIS — M17.12 ARTHRITIS OF KNEE, LEFT: ICD-10-CM

## 2024-09-04 DIAGNOSIS — M23.8X2 KNEE CREPITUS, LEFT: ICD-10-CM

## 2024-09-04 PROCEDURE — 97112 NEUROMUSCULAR REEDUCATION: CPT

## 2024-09-04 PROCEDURE — 20610 DRAIN/INJ JOINT/BURSA W/O US: CPT | Performed by: PHYSICIAN ASSISTANT

## 2024-09-04 PROCEDURE — 97140 MANUAL THERAPY 1/> REGIONS: CPT

## 2024-09-04 PROCEDURE — 99214 OFFICE O/P EST MOD 30 MIN: CPT | Performed by: PHYSICIAN ASSISTANT

## 2024-09-04 PROCEDURE — 73562 X-RAY EXAM OF KNEE 3: CPT | Performed by: PHYSICIAN ASSISTANT

## 2024-09-04 PROCEDURE — 1123F ACP DISCUSS/DSCN MKR DOCD: CPT | Performed by: PHYSICIAN ASSISTANT

## 2024-09-04 PROCEDURE — 97110 THERAPEUTIC EXERCISES: CPT

## 2024-09-04 PROCEDURE — 97530 THERAPEUTIC ACTIVITIES: CPT

## 2024-09-04 RX ORDER — BUPIVACAINE HYDROCHLORIDE 5 MG/ML
7 INJECTION, SOLUTION PERINEURAL ONCE
Status: COMPLETED | OUTPATIENT
Start: 2024-09-04 | End: 2024-09-04

## 2024-09-04 RX ORDER — TRIAMCINOLONE ACETONIDE 40 MG/ML
80 INJECTION, SUSPENSION INTRA-ARTICULAR; INTRAMUSCULAR ONCE
Status: COMPLETED | OUTPATIENT
Start: 2024-09-04 | End: 2024-09-04

## 2024-09-04 RX ADMIN — BUPIVACAINE HYDROCHLORIDE 35 MG: 5 INJECTION, SOLUTION PERINEURAL at 10:15

## 2024-09-04 RX ADMIN — TRIAMCINOLONE ACETONIDE 80 MG: 40 INJECTION, SUSPENSION INTRA-ARTICULAR; INTRAMUSCULAR at 10:15

## 2024-09-04 NOTE — PROGRESS NOTES
To just happen      Patient: Mode Griffin                MRN: [id number]       SSN: [id number]  YOB: 1955        AGE: 69 y.o.        SEX: male      OFFICE NOTE DICTATED    PCP: Willis Gallagher PA  09/04/24    Chief Complaint   Patient presents with    Knee Pain     Left         HISTORY:    Mode Griffin is a 69 y.o. male returns with a complaint of acute on chronic left knee pain.  Patient is status post just over 3 months right primary total knee replacement doing well.  He is continuing with outpatient physical therapy with attention to terminal flexion and extension.  His left knee has become painful when he stands for only short period of time and walk short distances.  This is worsened over the past 3 months.  He feels like he is overused his left knee favoring his right through it is post replacement recovery..            No results found for: \"HBA1C\", \"[id number]\"      9/4/2024     9:14 AM 8/30/2024     9:18 AM 7/30/2024     1:14 PM 7/2/2024     1:14 PM 6/23/2024     6:48 PM 6/18/2024     6:41 PM 6/11/2024     3:50 PM   Weight Metrics   Weight 212 lb 210 lb 207 lb 219 lb 218 lb 218 lb 0.6 oz 218 lb   BMI (Calculated) 28 kg/m2 27.8 kg/m2 27.4 kg/m2 29 kg/m2 28.8 kg/m2 28.8 kg/m2 28.8 kg/m2          Problem List Items Addressed This Visit    None  Visit Diagnoses       Chronic pain of left knee    -  Primary    Relevant Orders    [96910] Knee 3V    Arthritis of knee, left        Decreased range of motion of left knee        Knee crepitus, left        S/P TKR (total knee replacement), right                PAST MEDICAL HISTORY:       Diagnosis Date    Arthritis     knee    BPH (benign prostatic hyperplasia)     Cancer (HCC)     prostate - had radiation    Chest pain, unspecified 9/10/12    possible angina, chest wall, GERD; Pt has difficulty in exercising due to knee arthritis; Symptoms include chest pain.  The pain is located in the (L) anterior chest and (R) anterior chest.  There is no

## 2024-09-04 NOTE — PROGRESS NOTES
PHYSICAL / OCCUPATIONAL THERAPY - DAILY TREATMENT NOTE    Patient Name: Mode Griffin    Date: 2024    : 1955  Insurance: Payor: [employer] OPTUM / Plan: V[employer] OPTUM / Product Type: *No Product type* /      Patient  verified Yes     Visit #   Current / Total 8 10   Time   In / Out 0730 0810   Pain   In / Out 2-310 0/10   Subjective Functional Status/Changes: Patient reports that he has not been doing his exercises at home so much.      TREATMENT AREA =  Pain in right knee [M25.561]  S/P TKR (total knee replacement), right [Z96.651]     OBJECTIVE      Therapeutic Procedures:    Tx Min Billable or 1:1 Min (if diff from Tx Min) Procedure, Rationale, Specifics   13 13 15275 Therapeutic Exercise (timed):  increase ROM, strength, coordination, balance, and proprioception to improve patient's ability to progress to PLOF and address remaining functional goals. (see flow sheet as applicable)     Details if applicable:  knee flex/ext ROM     10 10 53290 Neuromuscular Re-Education (timed):  improve balance, coordination, kinesthetic sense, posture, core stability and proprioception to improve patient's ability to develop conscious control of individual muscles and awareness of position of extremities in order to progress to PLOF and address remaining functional goals. (see flow sheet as applicable)     Details if applicable:  quad re-ed, hip neuromuscular control   9 9 14208 Therapeutic Activity (timed):  use of dynamic activities replicating functional movements to increase ROM, strength, coordination, balance, and proprioception in order to improve patient's ability to progress to PLOF and address remaining functional goals.  (see flow sheet as applicable)     Details if applicable:  squatting, STS, functional hip strength   8 8 31715 Manual Therapy (timed):  decrease pain, increase ROM, and increase tissue extensibility to improve patient's ability to progress to PLOF and address remaining functional goals.  The

## 2024-09-06 ENCOUNTER — APPOINTMENT (OUTPATIENT)
Facility: HOSPITAL | Age: 69
End: 2024-09-06
Payer: OTHER GOVERNMENT

## 2024-09-09 ENCOUNTER — HOSPITAL ENCOUNTER (OUTPATIENT)
Facility: HOSPITAL | Age: 69
Setting detail: RECURRING SERIES
Discharge: HOME | End: 2024-09-12
Payer: OTHER GOVERNMENT

## 2024-09-09 PROCEDURE — 97530 THERAPEUTIC ACTIVITIES: CPT

## 2024-09-09 PROCEDURE — 97110 THERAPEUTIC EXERCISES: CPT

## 2024-09-09 PROCEDURE — 97112 NEUROMUSCULAR REEDUCATION: CPT

## 2024-09-11 ENCOUNTER — HOSPITAL ENCOUNTER (OUTPATIENT)
Facility: HOSPITAL | Age: 69
Setting detail: RECURRING SERIES
Discharge: HOME | End: 2024-09-14
Payer: OTHER GOVERNMENT

## 2024-09-11 PROCEDURE — 97140 MANUAL THERAPY 1/> REGIONS: CPT

## 2024-09-11 PROCEDURE — 97110 THERAPEUTIC EXERCISES: CPT

## 2024-09-11 PROCEDURE — 97112 NEUROMUSCULAR REEDUCATION: CPT

## 2024-09-11 PROCEDURE — 97530 THERAPEUTIC ACTIVITIES: CPT

## 2024-09-12 ENCOUNTER — HOSPITAL ENCOUNTER (OUTPATIENT)
Facility: HOSPITAL | Age: 69
Setting detail: RECURRING SERIES
Discharge: HOME | End: 2024-09-15
Payer: OTHER GOVERNMENT

## 2024-09-12 PROCEDURE — 97530 THERAPEUTIC ACTIVITIES: CPT

## 2024-09-12 PROCEDURE — 97112 NEUROMUSCULAR REEDUCATION: CPT

## 2024-09-12 PROCEDURE — 97110 THERAPEUTIC EXERCISES: CPT

## 2024-09-13 ENCOUNTER — APPOINTMENT (OUTPATIENT)
Facility: HOSPITAL | Age: 69
End: 2024-09-13
Payer: OTHER GOVERNMENT

## 2024-09-16 ENCOUNTER — HOSPITAL ENCOUNTER (OUTPATIENT)
Facility: HOSPITAL | Age: 69
Setting detail: RECURRING SERIES
Discharge: HOME | End: 2024-09-19
Payer: OTHER GOVERNMENT

## 2024-09-16 PROCEDURE — 97112 NEUROMUSCULAR REEDUCATION: CPT

## 2024-09-16 PROCEDURE — 97140 MANUAL THERAPY 1/> REGIONS: CPT

## 2024-09-16 PROCEDURE — 97530 THERAPEUTIC ACTIVITIES: CPT

## 2024-09-16 PROCEDURE — 97110 THERAPEUTIC EXERCISES: CPT

## 2024-09-18 ENCOUNTER — APPOINTMENT (OUTPATIENT)
Facility: HOSPITAL | Age: 69
End: 2024-09-18
Payer: OTHER GOVERNMENT

## 2024-09-20 ENCOUNTER — APPOINTMENT (OUTPATIENT)
Facility: HOSPITAL | Age: 69
End: 2024-09-20
Payer: OTHER GOVERNMENT

## 2024-10-16 ENCOUNTER — HOSPITAL ENCOUNTER (OUTPATIENT)
Facility: HOSPITAL | Age: 69
Setting detail: RECURRING SERIES
Discharge: HOME | End: 2024-10-19
Payer: OTHER GOVERNMENT

## 2024-10-16 PROCEDURE — 97530 THERAPEUTIC ACTIVITIES: CPT

## 2024-10-16 PROCEDURE — 97110 THERAPEUTIC EXERCISES: CPT

## 2024-10-16 NOTE — PROGRESS NOTES
PHYSICAL / OCCUPATIONAL THERAPY - DAILY TREATMENT NOTE    Patient Name: Mode Griffin    Date: 10/16/2024    : 1955  Insurance: Payor: [employer]UN OPTUM / Plan: VACCN OPTUM / Product Type: *No Product type* /      Patient  verified Yes     Visit #   Current / Total 5 10   Time   In / Out 0852 0977   Pain   In / Out 0/10 0/10   Subjective Functional Status/Changes: Patient reports that he has been doing his exercises and working out at the gym while he was     TREATMENT AREA =  Pain in right knee [M25.561]  S/P TKR (total knee replacement), right [Z96.651]     OBJECTIVE    Therapeutic Procedures:    Tx Min Billable or 1:1 Min (if diff from Tx Min) Procedure, Rationale, Specifics   10 10 35592 Therapeutic Exercise (timed):  increase ROM, strength, coordination, balance, and proprioception to improve patient's ability to progress to PLOF and address remaining functional goals. (see flow sheet as applicable)     Details if applicable:       15 15 48889 Therapeutic Activity (timed):  use of dynamic activities replicating functional movements to increase ROM, strength, coordination, balance, and proprioception in order to improve patient's ability to progress to PLOF and address remaining functional goals.  (see flow sheet as applicable)     Details if applicable:  progress update   25 25 [facility] Totals Reminder: bill using total billable min of TIMED therapeutic procedures (example: do not include dry needle or estim unattended, both untimed codes, in totals to left)  8-22 min = 1 unit; 23-37 min = 2 units; 38-52 min = 3 units; 53-67 min = 4 units; 68-82 min = 5 units   Total Total     [x]  Patient Education billed concurrently with other procedures   [x] Review HEP    [] Progressed/Changed HEP, detail:    [] Other detail:       Objective Information/Functional Measures/Assessment    Patient is a pleasant 69 year old male with c/o R knee pain; s/p R TKA on 24. Patient has completed x13 visits of skilled PT to

## 2024-10-16 NOTE — PROGRESS NOTES
[facility] [address]  [address]  [place] [address]  [phone] fax  PROGRESS NOTE  Patient Name: Mode Griffin : 1955   Treatment/Medical Diagnosis: Pain in right knee [M25.561]  S/P TKR (total knee replacement), right [Z96.651]   Referral Source:  Payor Rafael Quezada PA-C  Payor: [employer] OPTUM / Plan: VACCN OPTUM / Product Type: *No Product type* /      Date of Initial Visit: 24 Attended Visits: 13 Missed Visits: 0     SUMMARY OF TREATMENT  Patient is a pleasant 69 year old male with c/o R knee pain; s/p R TKA on 24. Patient has completed x13 visits of skilled PT to address, reporting 85% improvement since SOC. Reports functional gains including reciprocally negotiating stairs up and down, increased overall activity, and improved walking without cane. Reports functional deficits including bending R knee. Objectively, patient presents with improved overall pain levels, improved PROM R knee flex, and improved squatting ability. Progress since last PN limited by about 1 month off from PT 2/2 awaiting auth from insurance. Remains lacking significant knee ext mobility. Patient will continue to benefit from skilled PT to address the remaining deficits and improve functional mobility, so that he can perform ambulation, ADLs, and squatting safely with minimal pain or limitation.     CURRENT STATUS  Functional Gains: Going up/down steps, increased activity, improved walking without cane  Functional Deficits: Bending R knee   % improvement: 85% improvement  Pain   Average: 2/10       Best: 0/10     Worst: 2/10  Patient Goal: \"To be 100%; to be able to pick things up from the floor\"    Pt will be able to report a 2/10 pain rating at worst to improve patient's ability to tolerate prolonged functional activities at home.               RC: 4/10 at worst (24)    MET: 2/10 at worst (10/16/24)     2. Pt will be able to perform 5 sit to stands

## 2024-10-17 ENCOUNTER — HOSPITAL ENCOUNTER (OUTPATIENT)
Facility: HOSPITAL | Age: 69
Setting detail: RECURRING SERIES
Discharge: HOME | End: 2024-10-20
Payer: OTHER GOVERNMENT

## 2024-10-17 PROCEDURE — 97530 THERAPEUTIC ACTIVITIES: CPT

## 2024-10-17 PROCEDURE — 97110 THERAPEUTIC EXERCISES: CPT

## 2024-10-17 PROCEDURE — 97112 NEUROMUSCULAR REEDUCATION: CPT

## 2024-10-17 NOTE — PROGRESS NOTES
PHYSICAL / OCCUPATIONAL THERAPY - DAILY TREATMENT NOTE    Patient Name: Mode Griffin    Date: 10/17/2024    : 1955  Insurance: Payor: V[employer] OPTUM / Plan: VACCN OPTUM / Product Type: *No Product type* /      Patient  verified Yes     Visit #   Current / Total 1 10   Time   In / Out 1012 1049   Pain   In / Out 2-3 0/10   Subjective Functional Status/Changes: \"I'm sore from yesterday's appointment\"     TREATMENT AREA =  Pain in right knee [M25.561]  S/P TKR (total knee replacement), right [Z96.651]     OBJECTIVE    Therapeutic Procedures:    Tx Min Billable or 1:1 Min (if diff from Tx Min) Procedure, Rationale, Specifics   15 15 74210 Therapeutic Exercise (timed):  increase ROM, strength, coordination, balance, and proprioception to improve patient's ability to progress to PLOF and address remaining functional goals. (see flow sheet as applicable)     Details if applicable:       12 12 17538 Neuromuscular Re-Education (timed):  improve balance, coordination, kinesthetic sense, posture, core stability and proprioception to improve patient's ability to develop conscious control of individual muscles and awareness of position of extremities in order to progress to PLOF and address remaining functional goals. (see flow sheet as applicable)     Details if applicable:  quad re-ed/control, hip neuromuscular control    10 10 97960 Therapeutic Activity (timed):  use of dynamic activities replicating functional movements to increase ROM, strength, coordination, balance, and proprioception in order to improve patient's ability to progress to PLOF and address remaining functional goals.  (see flow sheet as applicable)     Details if applicable:  bridging, step ups   37 37 [facility] Totals Reminder: bill using total billable min of TIMED therapeutic procedures (example: do not include dry needle or estim unattended, both untimed codes, in totals to left)  8-22 min = 1 unit; 23-37 min = 2 units; 38-52 min = 3 units; 53-67 min

## 2024-10-23 ENCOUNTER — HOSPITAL ENCOUNTER (OUTPATIENT)
Facility: HOSPITAL | Age: 69
Setting detail: RECURRING SERIES
Discharge: HOME | End: 2024-10-26
Payer: OTHER GOVERNMENT

## 2024-10-23 PROCEDURE — 97530 THERAPEUTIC ACTIVITIES: CPT

## 2024-10-23 PROCEDURE — 97535 SELF CARE MNGMENT TRAINING: CPT

## 2024-10-23 PROCEDURE — 97112 NEUROMUSCULAR REEDUCATION: CPT

## 2024-10-23 PROCEDURE — 97110 THERAPEUTIC EXERCISES: CPT

## 2024-10-23 NOTE — PROGRESS NOTES
PHYSICAL / OCCUPATIONAL THERAPY - DAILY TREATMENT NOTE    Patient Name: Mode Griffin    Date: 10/23/2024    : 1955  Insurance: Payor: CHUN OPTUM / Plan: VACCN OPTUM / Product Type: *No Product type* /      Patient  verified Yes     Visit #   Current / Total 2 10   Time   In / Out 1010 1048   Pain   In / Out 2 0   Subjective Functional Status/Changes: \"I have a little pain.\"     TREATMENT AREA =  Pain in right knee [M25.561]  S/P TKR (total knee replacement), right [Z96.651]     OBJECTIVE         Therapeutic Procedures:    Tx Min Billable or 1:1 Min (if diff from Tx Min) Procedure, Rationale, Specifics   10  70617 Therapeutic Exercise (timed):  increase ROM, strength, coordination, balance, and proprioception to improve patient's ability to progress to PLOF and address remaining functional goals. (see flow sheet as applicable)     Details if applicable:       10  99984 Neuromuscular Re-Education (timed):  improve balance, coordination, kinesthetic sense, posture, core stability and proprioception to improve patient's ability to develop conscious control of individual muscles and awareness of position of extremities in order to progress to PLOF and address remaining functional goals. (see flow sheet as applicable)     Details if applicable:     10  91264 Therapeutic Activity (timed):  use of dynamic activities replicating functional movements to increase ROM, strength, coordination, balance, and proprioception in order to improve patient's ability to progress to PLOF and address remaining functional goals.  (see flow sheet as applicable)     Details if applicable:     8  34643 Self Care/Home Management (timed):  improve patient knowledge and understanding of pain reducing techniques, positioning, posture/ergonomics, home safety, activity modification, diagnosis/prognosis, and physical therapy expectations, procedures and progression  to improve patient's ability to progress to PLOF and address remaining

## 2024-10-25 ENCOUNTER — HOSPITAL ENCOUNTER (OUTPATIENT)
Facility: HOSPITAL | Age: 69
Setting detail: RECURRING SERIES
Discharge: HOME | End: 2024-10-28
Payer: OTHER GOVERNMENT

## 2024-10-25 PROCEDURE — 97112 NEUROMUSCULAR REEDUCATION: CPT

## 2024-10-25 PROCEDURE — 97535 SELF CARE MNGMENT TRAINING: CPT

## 2024-10-25 PROCEDURE — 97110 THERAPEUTIC EXERCISES: CPT

## 2024-10-25 PROCEDURE — 97530 THERAPEUTIC ACTIVITIES: CPT

## 2024-10-25 NOTE — PROGRESS NOTES
Rochelle PT [facility]   11/20/2024  7:30 AM Rochelle Powers PT [facility]   11/22/2024  8:10 AM Rochelle Powers, ANTONIO [facility]   11/25/2024  7:30 AM Rochelle Powers, PT [facility]   11/27/2024  8:10 AM Rochelle Powers, ANTONIO [facility]

## 2024-10-28 ENCOUNTER — HOSPITAL ENCOUNTER (OUTPATIENT)
Facility: HOSPITAL | Age: 69
Setting detail: RECURRING SERIES
Discharge: HOME | End: 2024-10-31
Payer: OTHER GOVERNMENT

## 2024-10-28 PROCEDURE — 97535 SELF CARE MNGMENT TRAINING: CPT

## 2024-10-28 PROCEDURE — 97530 THERAPEUTIC ACTIVITIES: CPT

## 2024-10-28 PROCEDURE — 97110 THERAPEUTIC EXERCISES: CPT

## 2024-10-28 PROCEDURE — 97112 NEUROMUSCULAR REEDUCATION: CPT

## 2024-10-28 NOTE — PROGRESS NOTES
ANTONIO Byrd [facility]   11/27/2024  8:10 AM Rochelle Powers PT [facility]

## 2024-10-30 ENCOUNTER — HOSPITAL ENCOUNTER (OUTPATIENT)
Facility: HOSPITAL | Age: 69
Setting detail: RECURRING SERIES
Discharge: HOME | End: 2024-11-02
Payer: OTHER GOVERNMENT

## 2024-10-30 PROCEDURE — 97112 NEUROMUSCULAR REEDUCATION: CPT

## 2024-10-30 PROCEDURE — 97535 SELF CARE MNGMENT TRAINING: CPT

## 2024-10-30 PROCEDURE — 97530 THERAPEUTIC ACTIVITIES: CPT

## 2024-10-30 PROCEDURE — 97110 THERAPEUTIC EXERCISES: CPT

## 2024-10-30 NOTE — PROGRESS NOTES
Details if applicable:  pt ed   39  [facility] Totals Reminder: bill using total billable min of TIMED therapeutic procedures (example: do not include dry needle or estim unattended, both untimed codes, in totals to left)  8-22 min = 1 unit; 23-37 min = 2 units; 38-52 min = 3 units; 53-67 min = 4 units; 68-82 min = 5 units   Total Total     [x]  Patient Education billed concurrently with other procedures   [x] Review HEP    [] Progressed/Changed HEP, detail:    [] Other detail:       Objective Information/Functional Measures/Assessment    Patient continues to progress well with strength and mobility. Challenged with introduction of SLS needing occasional FTT to maintain balance. Continues to have slightly excessive trunk flexion with unsupported squats, but otherwise good form. Noted better eccentric control with matrix flex/ext today.    Patient will continue to benefit from skilled PT / OT services to modify and progress therapeutic interventions, analyze and address functional mobility deficits, analyze and address ROM deficits, analyze and address strength deficits, analyze and address soft tissue restrictions, analyze and cue for proper movement patterns, analyze and modify for postural abnormalities, analyze and address imbalance/dizziness, and instruct in home and community integration to address functional deficits and attain remaining goals.    Progress toward goals / Updated goals:  []  See Progress Note/Recertification    1. Pt will be able to perform 5xSTS without UE in </= 12 sec support to improve his ability to get out of chairs with no arms in community. (Updated)                PN: 5xSTS in 14 sec without BUE, improved eccentric control (10/16/24)      2. Pt will be able to improve strength in right Hip flexion, knee flexion, knee extension to at least 5/5 to improve patient's ability to negotiate stairs at home & community.                PN: hip flex 4+/5, knee flex/ext 5/5 within range (10/16/24)

## 2024-10-31 ENCOUNTER — OFFICE VISIT (OUTPATIENT)
Age: 69
End: 2024-10-31

## 2024-10-31 VITALS — BODY MASS INDEX: 27.57 KG/M2 | HEIGHT: 73 IN | TEMPERATURE: 97 F | WEIGHT: 208 LBS

## 2024-10-31 DIAGNOSIS — M25.562 CHRONIC PAIN OF LEFT KNEE: Primary | ICD-10-CM

## 2024-10-31 DIAGNOSIS — Z96.651 S/P TKR (TOTAL KNEE REPLACEMENT), RIGHT: ICD-10-CM

## 2024-10-31 DIAGNOSIS — M17.12 UNILATERAL PRIMARY OSTEOARTHRITIS, LEFT KNEE: ICD-10-CM

## 2024-10-31 DIAGNOSIS — G89.29 CHRONIC PAIN OF LEFT KNEE: Primary | ICD-10-CM

## 2024-10-31 RX ORDER — BUPIVACAINE HYDROCHLORIDE 5 MG/ML
4 INJECTION, SOLUTION PERINEURAL ONCE
Status: COMPLETED | OUTPATIENT
Start: 2024-10-31 | End: 2024-10-31

## 2024-10-31 RX ORDER — BETAMETHASONE SODIUM PHOSPHATE AND BETAMETHASONE ACETATE 3; 3 MG/ML; MG/ML
3 INJECTION, SUSPENSION INTRA-ARTICULAR; INTRALESIONAL; INTRAMUSCULAR; SOFT TISSUE ONCE
Status: COMPLETED | OUTPATIENT
Start: 2024-10-31 | End: 2024-10-31

## 2024-10-31 RX ADMIN — BETAMETHASONE SODIUM PHOSPHATE AND BETAMETHASONE ACETATE 3 MG: 3; 3 INJECTION, SUSPENSION INTRA-ARTICULAR; INTRALESIONAL; INTRAMUSCULAR; SOFT TISSUE at 10:33

## 2024-10-31 RX ADMIN — BUPIVACAINE HYDROCHLORIDE 20 MG: 5 INJECTION, SOLUTION PERINEURAL at 10:32

## 2024-11-06 ENCOUNTER — HOSPITAL ENCOUNTER (OUTPATIENT)
Facility: HOSPITAL | Age: 69
Setting detail: RECURRING SERIES
Discharge: HOME | End: 2024-11-09
Payer: OTHER GOVERNMENT

## 2024-11-06 PROCEDURE — 97530 THERAPEUTIC ACTIVITIES: CPT

## 2024-11-06 PROCEDURE — 97112 NEUROMUSCULAR REEDUCATION: CPT

## 2024-11-06 PROCEDURE — 97535 SELF CARE MNGMENT TRAINING: CPT

## 2024-11-06 PROCEDURE — 97110 THERAPEUTIC EXERCISES: CPT

## 2024-11-06 NOTE — PROGRESS NOTES
PHYSICAL / OCCUPATIONAL THERAPY - DAILY TREATMENT NOTE    Patient Name: Mode Griffin    Date: 2024    : 1955  Insurance: Payor: CHUN OPTUM / Plan: VACCN OPTUM / Product Type: *No Product type* /      Patient  verified Yes     Visit #   Current / Total 6 10   Time   In / Out 850 938   Pain   In / Out 0 0   Subjective Functional Status/Changes: \"No pain.\"     TREATMENT AREA =  Pain in right knee [M25.561]  S/P TKR (total knee replacement), right [Z96.651]     OBJECTIVE         Therapeutic Procedures:    Tx Min Billable or 1:1 Min (if diff from Tx Min) Procedure, Rationale, Specifics   10  52320 Therapeutic Exercise (timed):  increase ROM, strength, coordination, balance, and proprioception to improve patient's ability to progress to PLOF and address remaining functional goals. (see flow sheet as applicable)     Details if applicable:       18  32560 Neuromuscular Re-Education (timed):  improve balance, coordination, kinesthetic sense, posture, core stability and proprioception to improve patient's ability to develop conscious control of individual muscles and awareness of position of extremities in order to progress to PLOF and address remaining functional goals. (see flow sheet as applicable)     Details if applicable:     10  24813 Therapeutic Activity (timed):  use of dynamic activities replicating functional movements to increase ROM, strength, coordination, balance, and proprioception in order to improve patient's ability to progress to PLOF and address remaining functional goals.  (see flow sheet as applicable)     Details if applicable:     10  31588 Self Care/Home Management (timed):  improve patient knowledge and understanding of home injury/symptom/pain management, positioning, posture/ergonomics, home safety, activity modification, transfer techniques, and joint protection strategies  to improve patient's ability to progress to PLOF and address remaining functional goals.  (see flow sheet

## 2024-11-08 ENCOUNTER — HOSPITAL ENCOUNTER (OUTPATIENT)
Facility: HOSPITAL | Age: 69
Setting detail: RECURRING SERIES
Discharge: HOME | End: 2024-11-11
Payer: OTHER GOVERNMENT

## 2024-11-08 PROCEDURE — 97530 THERAPEUTIC ACTIVITIES: CPT

## 2024-11-08 PROCEDURE — 97535 SELF CARE MNGMENT TRAINING: CPT

## 2024-11-08 PROCEDURE — 97110 THERAPEUTIC EXERCISES: CPT

## 2024-11-08 PROCEDURE — 97112 NEUROMUSCULAR REEDUCATION: CPT

## 2024-11-08 NOTE — PROGRESS NOTES
PHYSICAL / OCCUPATIONAL THERAPY - DAILY TREATMENT NOTE    Patient Name: Mode Griffin    Date: 2024    : 1955  Insurance: Payor: CHUN OPTUM / Plan: VACCN OPTUM / Product Type: *No Product type* /      Patient  verified Yes     Visit #   Current / Total 7 10   Time   In / Out 0814 0863   Pain   In / Out 2-3 1-2   Subjective Functional Status/Changes: Pt reports that he is feeling stiff upon arrival.      TREATMENT AREA =  Pain in right knee [M25.561]  S/P TKR (total knee replacement), right [Z96.651]     OBJECTIVE      Therapeutic Procedures:    Tx Min Billable or 1:1 Min (if diff from Tx Min) Procedure, Rationale, Specifics   10  78110 Therapeutic Exercise (timed):  increase ROM, strength, coordination, balance, and proprioception to improve patient's ability to progress to PLOF and address remaining functional goals. (see flow sheet as applicable)     Details if applicable:       10  73608 Neuromuscular Re-Education (timed):  improve balance, coordination, kinesthetic sense, posture, core stability and proprioception to improve patient's ability to develop conscious control of individual muscles and awareness of position of extremities in order to progress to PLOF and address remaining functional goals. (see flow sheet as applicable)     Details if applicable:  quad re-ed, balance, glute med activation   9  30519 Therapeutic Activity (timed):  use of dynamic activities replicating functional movements to increase ROM, strength, coordination, balance, and proprioception in order to improve patient's ability to progress to PLOF and address remaining functional goals.  (see flow sheet as applicable)     Details if applicable:     8  19960 Self Care/Home Management (timed):  improve patient knowledge and understanding of home injury/symptom/pain management, positioning, posture/ergonomics, home safety, activity modification, transfer techniques, and joint protection strategies  to improve patient's

## 2024-11-13 ENCOUNTER — HOSPITAL ENCOUNTER (OUTPATIENT)
Facility: HOSPITAL | Age: 69
Setting detail: RECURRING SERIES
Discharge: HOME | End: 2024-11-16
Payer: OTHER GOVERNMENT

## 2024-11-13 PROCEDURE — 97535 SELF CARE MNGMENT TRAINING: CPT

## 2024-11-13 PROCEDURE — 97110 THERAPEUTIC EXERCISES: CPT

## 2024-11-13 PROCEDURE — 97530 THERAPEUTIC ACTIVITIES: CPT

## 2024-11-13 PROCEDURE — 97112 NEUROMUSCULAR REEDUCATION: CPT

## 2024-11-13 NOTE — PROGRESS NOTES
PHYSICAL / OCCUPATIONAL THERAPY - DAILY TREATMENT NOTE    Patient Name: Mode Griffin    Date: 2024    : 1955  Insurance: Payor: CHUN OPTUM / Plan: VACCN OPTUM / Product Type: *No Product type* /      Patient  verified Yes     Visit #   Current / Total 8 10   Time   In / Out 810 850   Pain   In / Out 2-3 1-2   Subjective Functional Status/Changes: States that knee is a bit sore due to cold weather     TREATMENT AREA =  Pain in right knee [M25.561]  S/P TKR (total knee replacement), right [Z96.651]     OBJECTIVE         Therapeutic Procedures:    Tx Min Billable or 1:1 Min (if diff from Tx Min) Procedure, Rationale, Specifics   10 10 52485 Therapeutic Exercise (timed):  increase ROM, strength, coordination, balance, and proprioception to improve patient's ability to progress to PLOF and address remaining functional goals. (see flow sheet as applicable)     Details if applicable:       10 10 80656 Neuromuscular Re-Education (timed):  improve balance, coordination, kinesthetic sense, posture, core stability and proprioception to improve patient's ability to develop conscious control of individual muscles and awareness of position of extremities in order to progress to PLOF and address remaining functional goals. (see flow sheet as applicable)     Details if applicable:     10 10 54198 Therapeutic Activity (timed):  use of dynamic activities replicating functional movements to increase ROM, strength, coordination, balance, and proprioception in order to improve patient's ability to progress to PLOF and address remaining functional goals.  (see flow sheet as applicable)     Details if applicable:     10 10 80477 Self Care/Home Management (timed):  improve patient knowledge and understanding of home injury/symptom/pain management, positioning, posture/ergonomics, home safety, activity modification, transfer techniques, and joint protection strategies  to improve patient's ability to progress to PLOF and

## 2024-11-13 NOTE — PROGRESS NOTES
[facility] [address]  [address]  [place] [address]  [phone] fax  PROGRESS NOTE  Patient Name: Mode Griffin : 1955   Treatment/Medical Diagnosis: Pain in right knee [M25.561]  S/P TKR (total knee replacement), right [Z96.651]   Referral Source:  Payor Rafael Quezada PA-C  Payor: VACCN OPTUM / Plan: VACCN OPTUM / Product Type: *No Product type* /      Date of Initial Visit: 24 Attended Visits: 21 Missed Visits: 0     SUMMARY OF TREATMENT  Pt reporting 75-80% improvement since starting therapy. States that walking has improved in terms of balance but still having difficulty with ambulation endurance. Reports return to negotiating stairs reciprocally. States that getting in/out of car is better due to improve knee mobility into flexion. Regarding deficits, states that walking endurance still needs some work & still requires assistance from cart when grocery shopping. Reports continued apprehension to step downs with curbs due to continued fear of knee buckling possibly related to decrease knee extension range of motion. Objectively, he demonstrate good AROM into flexion but still lacking a good amount of knee extension; able to meet his STS goal & demonstrates progress with his squat goals but still demonstrating slight weight shift towards unaffected side and excessive trunk flexion. Full strength into hip flexion and knee ext/flexion; adding in hip abduction strength testing with impairments noted into both sides. Updating HEP with knee extension based to improve his mobility. Pt would benefit from continued skilled PT services 2x a week for another 5 weeks to address his functional deficits and improve his knee extension mobility.   CURRENT STATUS    1. Pt will be able to perform 5xSTS without UE in </= 12 sec support to improve his ability to get out of chairs with no arms in community. (Updated)                PN: 5xSTS in 14 sec without

## 2024-11-15 ENCOUNTER — HOSPITAL ENCOUNTER (OUTPATIENT)
Facility: HOSPITAL | Age: 69
Setting detail: RECURRING SERIES
Discharge: HOME | End: 2024-11-18
Payer: OTHER GOVERNMENT

## 2024-11-15 PROCEDURE — 97535 SELF CARE MNGMENT TRAINING: CPT

## 2024-11-15 PROCEDURE — 97110 THERAPEUTIC EXERCISES: CPT

## 2024-11-15 PROCEDURE — 97112 NEUROMUSCULAR REEDUCATION: CPT

## 2024-11-15 PROCEDURE — 97530 THERAPEUTIC ACTIVITIES: CPT

## 2024-11-15 NOTE — PROGRESS NOTES
to improve their ability to perform ambulation with improved gait mechanics.             PN: partially met, 12 - 110 degrees - updated hep with extension based movements    Still lacking end range extension [Date assessed: 11/15/2024]    4. Pt will be able to perform 5 functional squats to facilitate return to performing yardwork.               PN: progressing 10 squats reps with good depth, slight left sided weight shift and excessive trunk flexion     5. Pt will improve bilateral hip abduction strength to 5/5 to improve gait mechanics and improve stability during functional activities. (New goal)              PN: Left hip abduction: 4/5, Right: 4+/5     Next PN/ RC due 12/12/2024  Auth due (visit number/ date) 5 remaining by 04/06/2025    PLAN  - Continue Plan of Care    Carlos Rowley PTA, [facility]    11/15/2024    8:08 AM  If an interpreting service was utilized for treatment of this patient, the contents of this document represent the material reviewed with the patient via the .     Future Appointments   Date Time Provider Department Center   11/15/2024  8:10 AM Carlos Rowley, PT [facility]   11/20/2024  7:30 AM [facility]   11/22/2024  8:10 AM [facility]   11/25/2024  7:30 AM [facility]   11/27/2024  8:10 AM [facility] Powers[facility]

## 2024-11-20 ENCOUNTER — HOSPITAL ENCOUNTER (OUTPATIENT)
Facility: HOSPITAL | Age: 69
Setting detail: RECURRING SERIES
Discharge: HOME | End: 2024-11-23
Payer: OTHER GOVERNMENT

## 2024-11-20 PROCEDURE — 97530 THERAPEUTIC ACTIVITIES: CPT

## 2024-11-20 PROCEDURE — 97110 THERAPEUTIC EXERCISES: CPT

## 2024-11-20 PROCEDURE — 97535 SELF CARE MNGMENT TRAINING: CPT

## 2024-11-20 PROCEDURE — 97112 NEUROMUSCULAR REEDUCATION: CPT

## 2024-11-20 NOTE — PROGRESS NOTES
PHYSICAL / OCCUPATIONAL THERAPY - DAILY TREATMENT NOTE    Patient Name: Mode Griffin    Date: 2024    : 1955  Insurance: Payor: CHUN OPTUM / Plan: VACCN OPTUM / Product Type: *No Product type* /      Patient  verified Yes     Visit #   Current / Total 3 10   Time   In / Out 0730 0811   Pain   In / Out 0/10 0/10   Subjective Functional Status/Changes: \"No pain\"      TREATMENT AREA =  Pain in right knee [M25.561]  S/P TKR (total knee replacement), right [Z96.651]     OBJECTIVE    Therapeutic Procedures:    Tx Min Billable or 1:1 Min (if diff from Tx Min) Procedure, Rationale, Specifics   10  31940 Therapeutic Exercise (timed):  increase ROM, strength, coordination, balance, and proprioception to improve patient's ability to progress to PLOF and address remaining functional goals. (see flow sheet as applicable)     Details if applicable:       12  85440 Neuromuscular Re-Education (timed):  improve balance, coordination, kinesthetic sense, posture, core stability and proprioception to improve patient's ability to develop conscious control of individual muscles and awareness of position of extremities in order to progress to PLOF and address remaining functional goals. (see flow sheet as applicable)     Details if applicable:  quad/hamstring re-ed, hip stability     11  82373 Therapeutic Activity (timed):  use of dynamic activities replicating functional movements to increase ROM, strength, coordination, balance, and proprioception in order to improve patient's ability to progress to PLOF and address remaining functional goals.  (see flow sheet as applicable)     Details if applicable:  step ups, squatting, TKE   8  03161 Self Care/Home Management (timed):  improve patient knowledge and understanding of home injury/symptom/pain management, positioning, posture/ergonomics, home safety, activity modification, transfer techniques, and joint protection strategies  to improve patient's ability to progress to

## 2024-11-22 ENCOUNTER — HOSPITAL ENCOUNTER (OUTPATIENT)
Facility: HOSPITAL | Age: 69
Setting detail: RECURRING SERIES
Discharge: HOME | End: 2024-11-25
Payer: OTHER GOVERNMENT

## 2024-11-22 PROCEDURE — 97530 THERAPEUTIC ACTIVITIES: CPT

## 2024-11-22 PROCEDURE — 97112 NEUROMUSCULAR REEDUCATION: CPT

## 2024-11-22 PROCEDURE — 97535 SELF CARE MNGMENT TRAINING: CPT

## 2024-11-22 PROCEDURE — 97110 THERAPEUTIC EXERCISES: CPT

## 2024-11-22 NOTE — PROGRESS NOTES
to progress to PLOF and address remaining functional goals.  (see flow sheet as applicable)     Details if applicable:  pt ed, hamstring/knee ext mobility at home   39  [facility] Totals Reminder: bill using total billable min of TIMED therapeutic procedures (example: do not include dry needle or estim unattended, both untimed codes, in totals to left)  8-22 min = 1 unit; 23-37 min = 2 units; 38-52 min = 3 units; 53-67 min = 4 units; 68-82 min = 5 units   Total Total     [x]  Patient Education billed concurrently with other procedures   [x] Review HEP    [] Progressed/Changed HEP, detail:    [] Other detail:       Objective Information/Functional Measures/Assessment    Patient participated in today's session without adverse effect. Initiated contract/relax for hamstring flexibility with good return. Slight improvements in R knee ext mobility, but progress remains slow. Vc/tc for glute med activation with s/l hip abd. Progressed squats with added 5# KB; continued cuing required for keeping chest up but achieved good depth. Progress as able.     Patient will continue to benefit from skilled PT / OT services to modify and progress therapeutic interventions, analyze and address functional mobility deficits, analyze and address ROM deficits, analyze and address strength deficits, analyze and address soft tissue restrictions, analyze and cue for proper movement patterns, analyze and modify for postural abnormalities, analyze and address imbalance/dizziness, and instruct in home and community integration to address functional deficits and attain remaining goals.    Progress toward goals / Updated goals:  []  See Progress Note/Recertification    Pt will improve LEFS (functional outcome name) score to >/= 51 to demonstrate improvement in patient's ability to perform unrestricted ADLs/IADLs at home & community.              PN: regressed, 38 points      2.  Pt will report compliance with home HEP at end of care to enhance carry

## 2024-11-27 NOTE — PROGRESS NOTES
Patient: Mode Griffin                MRN: [id number]       SSN: [id number]  YOB: 1955        AGE: 69 y.o.        SEX: male      PCP: Willis Gallagher PA  12/05/24    Chief Complaint   Patient presents with    Knee Pain     left     HISTORY:  Mode Griffin is a 69 y.o. male who is seen for left knee pain. He presents today for his first injection in the Euflexxa visco supplementation series.    He was previously seen for  increased bilateral knee pain (L>R).  He denies any recent injury.  He feels pain with standing, walking and stair climbing. He experiences startup pain after sitting. He has not responded to previous cortisone injections, visco supplementation, or PT. He underwent right TKR on 6/18/24-- Doing well.      He was previously seen by Dr. Hodges for left shoulder pain.  He went right knee arthroscopy and partial medial meniscectomy by an orthopedic surgeon in [place] in the 1980s.     Occupation, etc: Mr. Griffin is retired. He previously worked as a  for [employer]. He lives alone in [place]. He has 3 adult sons, 1 adult daughter, and 1 granddaughter. He is not diabetic. He weighs 208 lbs and is 6'1\". He previously worked out but he is no longer able to exercise due to his knee pains. He served 4 years in the [employer].   Wt Readings from Last 3 Encounters:   12/05/24 94.3 kg (208 lb)   10/31/24 94.3 kg (208 lb)   09/04/24 96.2 kg (212 lb)      Body mass index is 27.44 kg/m².    Patient Active Problem List   Diagnosis    Chest pain, unspecified    Shortness of breath    Right knee pain    Arthritis    Primary osteoarthritis of right knee    S/P total knee arthroplasty, right       Social History     Tobacco Use    Smoking status: Never    Smokeless tobacco: Never   Vaping Use    Vaping status: Never Used   Substance Use Topics    Alcohol use: No    Drug use: No        No Known Allergies     Current Outpatient Medications

## 2024-12-05 ENCOUNTER — OFFICE VISIT (OUTPATIENT)
Age: 69
End: 2024-12-05

## 2024-12-05 VITALS — BODY MASS INDEX: 27.57 KG/M2 | WEIGHT: 208 LBS | TEMPERATURE: 97.8 F | HEIGHT: 73 IN

## 2024-12-05 DIAGNOSIS — M17.12 UNILATERAL PRIMARY OSTEOARTHRITIS, LEFT KNEE: Primary | ICD-10-CM

## 2024-12-05 RX ORDER — HYALURONATE SODIUM 10 MG/ML
20 SYRINGE (ML) INTRAARTICULAR ONCE
Status: COMPLETED | OUTPATIENT
Start: 2024-12-05 | End: 2024-12-05

## 2024-12-05 RX ADMIN — Medication 20 MG: at 09:27

## 2024-12-16 NOTE — PROGRESS NOTES
Patient: Mode Griffin                MRN: [id number]       SSN: [id number]  YOB: 1955        AGE: 69 y.o.        SEX: male  Body mass index is 28.23 kg/m².    PCP: Willis Gallagher PA  12/19/24    Chief Complaint   Patient presents with    Knee Pain    Follow-up     EUFLEXXA #2 LEFT KNEE     HISTORY:  Mode Griffin is a 69 y.o. male who is seen for left knee pain. He presents today for his second injection in the Euflexxa visco supplementation series.      ICD-10-CM    1. Unilateral primary osteoarthritis, left knee  M17.12 DRAIN/INJECT LARGE JOINT/BURSA     sodium hyaluronate (EUFLEXXA, HYALGAN) injection 20 mg         PROCEDURE:  Mr. Woodson left knee injected with 2 cc of Euflexxa.     Chart reviewed for the following:   Ishmael BAPTISTE MD, have reviewed the History, Physical and updated the Allergic reactions for Mode Griffin     TIME OUT performed immediately prior to start of procedure:  Ishmael BAPTISTE MD, have performed the following reviews on Mode Griffin prior to the start of the procedure:            * Patient was identified by name and date of birth   * Agreement on procedure being performed was verified  * Risks and Benefits explained to the patient  * Procedure site verified and marked as necessary  * Patient was positioned for comfort  * Consent was obtained     Time: 9:31 AM     Date of procedure: 12/19/2024    Procedure performed by:  Ishmael Anderson MD    Mr. Griffin tolerated the procedure well with no complications.    PLAN: Mr. Griffin's left knee injected with 2 cc of Euflexxa. Mr. Griffin will follow up in one week to complete his visco supplementation injection series.    Documentation by pino Shrestha, as documented by Ishmael Anderson MD.

## 2024-12-19 ENCOUNTER — OFFICE VISIT (OUTPATIENT)
Age: 69
End: 2024-12-19

## 2024-12-19 VITALS — TEMPERATURE: 97.8 F | BODY MASS INDEX: 28.36 KG/M2 | HEIGHT: 73 IN | WEIGHT: 214 LBS

## 2024-12-19 DIAGNOSIS — M17.12 UNILATERAL PRIMARY OSTEOARTHRITIS, LEFT KNEE: Primary | ICD-10-CM

## 2024-12-19 RX ORDER — HYALURONATE SODIUM 10 MG/ML
20 SYRINGE (ML) INTRAARTICULAR ONCE
Status: COMPLETED | OUTPATIENT
Start: 2024-12-19 | End: 2024-12-19

## 2024-12-19 RX ADMIN — Medication 20 MG: at 09:32

## 2025-04-07 ENCOUNTER — TELEPHONE (OUTPATIENT)
Age: 70
End: 2025-04-07

## 2025-04-07 NOTE — TELEPHONE ENCOUNTER
Affairs patient wants to follow up with Dr. Anderson for left knee pain, but his auth has . Please fax request for services form.    Patient can be reached at [phone].

## 2025-04-11 NOTE — TELEPHONE ENCOUNTER
Patient called to f/u on previous message sent. [facility]    Patient can be reached at [phone].

## 2025-04-17 ENCOUNTER — OFFICE VISIT (OUTPATIENT)
Age: 70
End: 2025-04-17
Payer: OTHER GOVERNMENT

## 2025-04-17 VITALS — WEIGHT: 219 LBS | BODY MASS INDEX: 29.03 KG/M2 | TEMPERATURE: 97.5 F | HEIGHT: 73 IN

## 2025-04-17 DIAGNOSIS — M17.12 PRIMARY OSTEOARTHRITIS OF LEFT KNEE: Primary | ICD-10-CM

## 2025-04-17 DIAGNOSIS — M17.12 UNILATERAL PRIMARY OSTEOARTHRITIS, LEFT KNEE: ICD-10-CM

## 2025-04-17 DIAGNOSIS — Z96.651 S/P TKR (TOTAL KNEE REPLACEMENT), RIGHT: ICD-10-CM

## 2025-04-17 DIAGNOSIS — G89.29 CHRONIC PAIN OF LEFT KNEE: Primary | ICD-10-CM

## 2025-04-17 DIAGNOSIS — M25.562 CHRONIC PAIN OF LEFT KNEE: Primary | ICD-10-CM

## 2025-04-17 DIAGNOSIS — Z01.818 PREOPERATIVE TESTING: ICD-10-CM

## 2025-04-17 PROCEDURE — 73564 X-RAY EXAM KNEE 4 OR MORE: CPT | Performed by: SPECIALIST

## 2025-04-17 PROCEDURE — 1123F ACP DISCUSS/DSCN MKR DOCD: CPT | Performed by: SPECIALIST

## 2025-04-17 PROCEDURE — 20610 DRAIN/INJ JOINT/BURSA W/O US: CPT | Performed by: SPECIALIST

## 2025-04-17 PROCEDURE — 99214 OFFICE O/P EST MOD 30 MIN: CPT | Performed by: SPECIALIST

## 2025-04-17 RX ORDER — BUPIVACAINE HYDROCHLORIDE 5 MG/ML
4 INJECTION, SOLUTION PERINEURAL ONCE
Status: COMPLETED | OUTPATIENT
Start: 2025-04-17 | End: 2025-04-17

## 2025-04-17 RX ORDER — BETAMETHASONE SODIUM PHOSPHATE AND BETAMETHASONE ACETATE 3; 3 MG/ML; MG/ML
3 INJECTION, SUSPENSION INTRA-ARTICULAR; INTRALESIONAL; INTRAMUSCULAR; SOFT TISSUE ONCE
Status: COMPLETED | OUTPATIENT
Start: 2025-04-17 | End: 2025-04-17

## 2025-04-17 RX ADMIN — BETAMETHASONE SODIUM PHOSPHATE AND BETAMETHASONE ACETATE 3 MG: 3; 3 INJECTION, SUSPENSION INTRA-ARTICULAR; INTRALESIONAL; INTRAMUSCULAR; SOFT TISSUE at 13:57

## 2025-04-17 RX ADMIN — BUPIVACAINE HYDROCHLORIDE 20 MG: 5 INJECTION, SOLUTION PERINEURAL at 13:58

## 2025-04-17 NOTE — PROGRESS NOTES
Patient: Mode Griffin                MRN: [id number]       SSN: [id number]  YOB: 1955        AGE: 69 y.o.        SEX: male      PCP: Willis Gallagher PA  04/17/25    Chief Complaint   Patient presents with    Knee Pain     LEFT       HISTORY:  Mode Griffin is a 69 y.o. male who is seen for increased left knee pain. Patient successfully completed a left knee Euflexxa series on 12/19/24 but his left knee pains have returned.  He denies any recent injury.  He feels pain with standing, walking and stair climbing. He experiences startup pain after sitting.      He underwent right TKR on 6/18/24-- Doing well. He would like to consider knee replacement surgery on the left since he did nicely on the right.      He was previously seen by Dr. Hodges for left shoulder pain.  He went right knee arthroscopy and partial medial meniscectomy by an orthopedic surgeon in [place] in the 1980s.     Occupation, etc: Mr. Griffin is retired. He previously worked as a  for [email or web address] [email or web address]. He lives alone in [place]. He has 3 adult sons, 1 adult daughter, and 1 granddaughter. He is not diabetic. He weighs 219 lbs and is 6'1\". His weight is stable. He works out at the gym regularly. He served 4 years in the [employer].   Wt Readings from Last 3 Encounters:   04/17/25 99.3 kg (219 lb)   12/19/24 97.1 kg (214 lb)   12/05/24 94.3 kg (208 lb)      Body mass index is 28.89 kg/m².    Patient Active Problem List   Diagnosis    Chest pain, unspecified    Shortness of breath    Right knee pain    Arthritis    Primary osteoarthritis of right knee    S/P total knee arthroplasty, right       Social History     Tobacco Use    Smoking status: Never    Smokeless tobacco: Never   Vaping Use    Vaping status: Never Used   Substance Use Topics    Alcohol use: No    Drug use: No        No Known Allergies     Current Outpatient Medications   Medication Sig    bisacodyl 5 MG EC tablet Take 1

## 2025-05-19 ENCOUNTER — PREP FOR PROCEDURE (OUTPATIENT)
Age: 70
End: 2025-05-19

## 2025-05-19 ENCOUNTER — HOSPITAL ENCOUNTER (OUTPATIENT)
Facility: HOSPITAL | Age: 70
Discharge: HOME | End: 2025-05-22
Payer: OTHER GOVERNMENT

## 2025-05-19 DIAGNOSIS — Z01.818 PREOPERATIVE TESTING: ICD-10-CM

## 2025-05-19 DIAGNOSIS — M17.12 PRIMARY OSTEOARTHRITIS OF LEFT KNEE: ICD-10-CM

## 2025-05-19 DIAGNOSIS — M17.12 PRIMARY OSTEOARTHRITIS OF LEFT KNEE: Primary | ICD-10-CM

## 2025-05-19 LAB
ALBUMIN SERPL-MCNC: 3.6 G/DL (ref 3.4–5)
ALBUMIN/GLOB SERPL: 1.1 (ref 0.8–1.7)
ALP SERPL-CCNC: 95 U/L (ref 45–117)
ALT SERPL-CCNC: 25 U/L (ref 10–50)
ANION GAP SERPL CALC-SCNC: 10 MMOL/L (ref 3–18)
APPEARANCE UR: CLEAR
APTT PPP: 28 SEC (ref 23–36.4)
AST SERPL-CCNC: 27 U/L (ref 10–38)
BASOPHILS # BLD: 0.03 K/UL (ref 0–0.1)
BASOPHILS NFR BLD: 0.8 % (ref 0–2)
BILIRUB SERPL-MCNC: 0.4 MG/DL (ref 0.2–1)
BILIRUB UR QL: NEGATIVE
BUN SERPL-MCNC: 11 MG/DL (ref 6–23)
BUN/CREAT SERPL: 15 (ref 12–20)
CALCIUM SERPL-MCNC: 10.2 MG/DL (ref 8.5–10.1)
CHLORIDE SERPL-SCNC: 106 MMOL/L (ref 98–107)
CO2 SERPL-SCNC: 25 MMOL/L (ref 21–32)
COLOR UR: YELLOW
CREAT SERPL-MCNC: 0.7 MG/DL (ref 0.6–1.3)
DIFFERENTIAL METHOD BLD: ABNORMAL
EOSINOPHIL # BLD: 0.03 K/UL (ref 0–0.4)
EOSINOPHIL NFR BLD: 0.8 % (ref 0–5)
ERYTHROCYTE [DISTWIDTH] IN BLOOD BY AUTOMATED COUNT: 12.1 % (ref 11.6–14.5)
EST. AVERAGE GLUCOSE BLD GHB EST-MCNC: 95 MG/DL
GLOBULIN SER CALC-MCNC: 3.2 G/DL (ref 2–4)
GLUCOSE SERPL-MCNC: 80 MG/DL (ref 74–108)
GLUCOSE UR STRIP.AUTO-MCNC: NEGATIVE MG/DL
HBA1C MFR BLD: 5 % (ref 4.2–5.6)
HCT VFR BLD AUTO: 37.9 % (ref 36–48)
HGB BLD-MCNC: 12.5 G/DL (ref 13–16)
HGB UR QL STRIP: NEGATIVE
IMM GRANULOCYTES # BLD AUTO: 0.01 K/UL (ref 0–0.04)
IMM GRANULOCYTES NFR BLD AUTO: 0.3 % (ref 0–0.5)
INR PPP: 1 (ref 0.9–1.1)
KETONES UR QL STRIP.AUTO: NEGATIVE MG/DL
LEUKOCYTE ESTERASE UR QL STRIP.AUTO: NEGATIVE
LYMPHOCYTES # BLD: 1.59 K/UL (ref 0.9–3.6)
LYMPHOCYTES NFR BLD: 43.6 % (ref 21–52)
MCH RBC QN AUTO: 30 PG (ref 24–34)
MCHC RBC AUTO-ENTMCNC: 33 G/DL (ref 31–37)
MCV RBC AUTO: 90.9 FL (ref 78–100)
MONOCYTES # BLD: 0.41 K/UL (ref 0.05–1.2)
MONOCYTES NFR BLD: 11.2 % (ref 3–10)
NEUTS SEG # BLD: 1.58 K/UL (ref 1.8–8)
NEUTS SEG NFR BLD: 43.3 % (ref 40–73)
NITRITE UR QL STRIP.AUTO: NEGATIVE
NRBC # BLD: 0 K/UL (ref 0–0.01)
NRBC BLD-RTO: 0 PER 100 WBC
PH UR STRIP: 6.5 (ref 5–8)
PLATELET # BLD AUTO: 189 K/UL (ref 135–420)
PMV BLD AUTO: 10.5 FL (ref 9.2–11.8)
POTASSIUM SERPL-SCNC: 4.2 MMOL/L (ref 3.5–5.5)
PROT SERPL-MCNC: 6.8 G/DL (ref 6.4–8.2)
PROT UR STRIP-MCNC: NEGATIVE MG/DL
PROTHROMBIN TIME: 13.6 SEC (ref 11.9–14.9)
RBC # BLD AUTO: 4.17 M/UL (ref 4.35–5.65)
SODIUM SERPL-SCNC: 141 MMOL/L (ref 136–145)
SP GR UR REFRACTOMETRY: 1.02 (ref 1–1.03)
UROBILINOGEN UR QL STRIP.AUTO: 1 EU/DL (ref 0.2–1)
WBC # BLD AUTO: 3.7 K/UL (ref 4.6–13.2)

## 2025-05-19 PROCEDURE — 81003 URINALYSIS AUTO W/O SCOPE: CPT

## 2025-05-19 PROCEDURE — 85025 COMPLETE CBC W/AUTO DIFF WBC: CPT

## 2025-05-19 PROCEDURE — 85610 PROTHROMBIN TIME: CPT

## 2025-05-19 PROCEDURE — 80053 COMPREHEN METABOLIC PANEL: CPT

## 2025-05-19 PROCEDURE — 36415 COLL VENOUS BLD VENIPUNCTURE: CPT

## 2025-05-19 PROCEDURE — 83036 HEMOGLOBIN GLYCOSYLATED A1C: CPT

## 2025-05-19 PROCEDURE — 85730 THROMBOPLASTIN TIME PARTIAL: CPT

## 2025-06-11 ENCOUNTER — OFFICE VISIT (OUTPATIENT)
Age: 70
End: 2025-06-11

## 2025-06-11 VITALS
HEART RATE: 78 BPM | RESPIRATION RATE: 16 BRPM | HEIGHT: 73 IN | SYSTOLIC BLOOD PRESSURE: 143 MMHG | WEIGHT: 212 LBS | DIASTOLIC BLOOD PRESSURE: 86 MMHG | BODY MASS INDEX: 28.1 KG/M2

## 2025-06-11 DIAGNOSIS — M19.90 ARTHRITIS: Primary | ICD-10-CM

## 2025-06-11 PROCEDURE — 99024 POSTOP FOLLOW-UP VISIT: CPT | Performed by: PHYSICIAN ASSISTANT

## 2025-06-11 RX ORDER — TRANEXAMIC ACID 100 MG/ML
1000 INJECTION, SOLUTION INTRAVENOUS ONCE
OUTPATIENT
Start: 2025-06-17

## 2025-06-11 RX ORDER — GABAPENTIN 100 MG/1
300 CAPSULE ORAL
OUTPATIENT
Start: 2025-06-17 | End: 2025-06-17

## 2025-06-11 RX ORDER — ACETAMINOPHEN 325 MG/1
650 TABLET ORAL
OUTPATIENT
Start: 2025-06-17 | End: 2025-06-17

## 2025-06-11 NOTE — H&P (VIEW-ONLY)
[facility] AMBULATORY OFFICE NOTE    Patient: Mode Griffin                MRN: [id number]       SSN: [id number]  YOB: 1955        AGE: 69 y.o.        SEX: male      OFFICE NOTE DICTATED    PCP: Willis Gallagher PA  06/11/25    Chief Complaint   Patient presents with    H&P     Left TKA 6/17/25       HISTORY:    Mode Griffin is a 69 y.o. male seen in preparation for left knee  total joint replacement.              6/11/2025     9:43 AM 4/17/2025     1:49 PM 12/19/2024     9:17 AM 12/5/2024     9:13 AM 10/31/2024    10:14 AM 9/4/2024     9:14 AM 8/30/2024     9:18 AM   Weight Metrics   Weight 212 lb 219 lb 214 lb 208 lb 208 lb 212 lb 210 lb   BMI (Calculated) 28 kg/m2 29 kg/m2 28.3 kg/m2 27.5 kg/m2 27.5 kg/m2 28 kg/m2 27.8 kg/m2          Problem List Items Addressed This Visit    None      PAST MEDICAL HISTORY:       Diagnosis Date    Arthritis     knee    BPH (benign prostatic hyperplasia)     Cancer (HCC)     prostate - had radiation    Chest pain, unspecified 9/10/12    possible angina, chest wall, GERD; Pt has difficulty in exercising due to knee arthritis; Symptoms include chest pain.  The pain is located in the (L) anterior chest and (R) anterior chest.  There is no radiation.  The pt describes the pain as sharp.  Onset was gradual 4 week(s) ago.    Hypercholesteremia     Hypertension     Shortness of breath     R/O CHF, CMP; The onset of the sob has been gradual.  It has been occurring in an episodic pattern for weeks. The course has been increasing and the severity level is mild.  The sob occurs when climbing stairs.        PAST SURGICAL HISTORY:       Procedure Laterality Date    COLONOSCOPY      HERNIA REPAIR      ORTHOPEDIC SURGERY      knee arthroscopy    TOTAL KNEE ARTHROPLASTY Right 6/18/2024    RIGHT TOTAL KNEE REPLACEMENT performed by Ishmael Anderson MD at [facility] MAIN OR    UROLOGICAL SURGERY  06/03/2019    TRUS Volume:  31 cm3 .  Fay 8 (4+4) R [place].

## 2025-06-11 NOTE — H&P
[facility] AMBULATORY OFFICE NOTE    Patient: Mode Griffin                MRN: [id number]       SSN: [id number]  YOB: 1955        AGE: 69 y.o.        SEX: male      OFFICE NOTE DICTATED    PCP: Willis Gallagher PA  06/11/25    Chief Complaint   Patient presents with    H&P     Left TKA 6/17/25       HISTORY:    Mode Griffin is a 69 y.o. male seen in preparation for left knee  total joint replacement.              6/11/2025     9:43 AM 4/17/2025     1:49 PM 12/19/2024     9:17 AM 12/5/2024     9:13 AM 10/31/2024    10:14 AM 9/4/2024     9:14 AM 8/30/2024     9:18 AM   Weight Metrics   Weight 212 lb 219 lb 214 lb 208 lb 208 lb 212 lb 210 lb   BMI (Calculated) 28 kg/m2 29 kg/m2 28.3 kg/m2 27.5 kg/m2 27.5 kg/m2 28 kg/m2 27.8 kg/m2          Problem List Items Addressed This Visit    None      PAST MEDICAL HISTORY:       Diagnosis Date    Arthritis     knee    BPH (benign prostatic hyperplasia)     Cancer (HCC)     prostate - had radiation    Chest pain, unspecified 9/10/12    possible angina, chest wall, GERD; Pt has difficulty in exercising due to knee arthritis; Symptoms include chest pain.  The pain is located in the (L) anterior chest and (R) anterior chest.  There is no radiation.  The pt describes the pain as sharp.  Onset was gradual 4 week(s) ago.    Hypercholesteremia     Hypertension     Shortness of breath     R/O CHF, CMP; The onset of the sob has been gradual.  It has been occurring in an episodic pattern for weeks. The course has been increasing and the severity level is mild.  The sob occurs when climbing stairs.        PAST SURGICAL HISTORY:       Procedure Laterality Date    COLONOSCOPY      HERNIA REPAIR      ORTHOPEDIC SURGERY      knee arthroscopy    TOTAL KNEE ARTHROPLASTY Right 6/18/2024    RIGHT TOTAL KNEE REPLACEMENT performed by Ishmael Anderson MD at [facility] MAIN OR    UROLOGICAL SURGERY  06/03/2019    TRUS Volume:  31 cm3 .  Fay 8 (4+4) R [place].

## 2025-06-12 RX ORDER — MULTIVIT-MIN/IRON/FOLIC ACID/K 18-600-40
2000 CAPSULE ORAL DAILY
COMMUNITY

## 2025-06-12 RX ORDER — OXYCODONE HYDROCHLORIDE 10 MG/1
10 TABLET ORAL EVERY 8 HOURS PRN
Status: ON HOLD | COMMUNITY
Start: 2025-04-30 | End: 2025-06-17 | Stop reason: HOSPADM

## 2025-06-12 RX ORDER — TIZANIDINE 2 MG/1
2 TABLET ORAL EVERY 8 HOURS PRN
COMMUNITY
Start: 2025-04-15

## 2025-06-12 RX ORDER — SILDENAFIL 100 MG/1
100 TABLET, FILM COATED ORAL PRN
COMMUNITY
Start: 2025-03-25

## 2025-06-12 RX ORDER — IBUPROFEN 800 MG/1
800 TABLET, FILM COATED ORAL EVERY 8 HOURS PRN
Status: ON HOLD | COMMUNITY
Start: 2025-03-25 | End: 2025-06-17 | Stop reason: HOSPADM

## 2025-06-12 ASSESSMENT — PROMIS GLOBAL HEALTH SCALE
IN THE PAST 7 DAYS, HOW OFTEN HAVE YOU BEEN BOTHERED BY EMOTIONAL PROBLEMS, SUCH AS FEELING ANXIOUS, DEPRESSED, OR IRRITABLE [ON A SCALE FROM 1 (NEVER) TO 5 (ALWAYS)]?: NEVER
IN GENERAL, PLEASE RATE HOW WELL YOU CARRY OUT YOUR USUAL SOCIAL ACTIVITIES (INCLUDES ACTIVITIES AT HOME, AT WORK, AND IN YOUR COMMUNITY, AND RESPONSIBILITIES AS A PARENT, CHILD, SPOUSE, EMPLOYEE, FRIEND, ETC) [ON A SCALE OF 1 (POOR) TO 5 (EXCELLENT)]?: VERY GOOD
IN THE PAST 7 DAYS, HOW WOULD YOU RATE YOUR PAIN ON AVERAGE [ON A SCALE FROM 0 (NO PAIN) TO 10 (WORST IMAGINABLE PAIN)]?: 8
TO WHAT EXTENT ARE YOU ABLE TO CARRY OUT YOUR EVERYDAY PHYSICAL ACTIVITIES SUCH AS WALKING, CLIMBING STAIRS, CARRYING GROCERIES, OR MOVING A CHAIR [ON A SCALE OF 1 (NOT AT ALL) TO 5 (COMPLETELY)]?: MOSTLY
IN GENERAL, WOULD YOU SAY YOUR QUALITY OF LIFE IS...[ON A SCALE OF 1 (POOR) TO 5 (EXCELLENT)]: VERY GOOD
IN GENERAL, HOW WOULD YOU RATE YOUR SATISFACTION WITH YOUR SOCIAL ACTIVITIES AND RELATIONSHIPS [ON A SCALE OF 1 (POOR) TO 5 (EXCELLENT)]?: VERY GOOD
WHO IS THE PERSON COMPLETING THE PROMIS V1.1 SURVEY?: SELF
SUM OF RESPONSES TO QUESTIONS 3, 6, 7, & 8: 20
IN GENERAL, HOW WOULD YOU RATE YOUR PHYSICAL HEALTH [ON A SCALE OF 1 (POOR) TO 5 (EXCELLENT)]?: VERY GOOD
IN GENERAL, WOULD YOU SAY YOUR HEALTH IS...[ON A SCALE OF 1 (POOR) TO 5 (EXCELLENT)]: EXCELLENT
HOW IS THE PROMIS V1.1 BEING ADMINISTERED?: TELEPHONE
IN THE PAST 7 DAYS, HOW WOULD YOU RATE YOUR FATIGUE ON AVERAGE [ON A SCALE FROM 1 (NONE) TO 5 (VERY SEVERE)]?: MILD
IN GENERAL, HOW WOULD YOU RATE YOUR MENTAL HEALTH, INCLUDING YOUR MOOD AND YOUR ABILITY TO THINK [ON A SCALE OF 1 (POOR) TO 5 (EXCELLENT)]?: VERY GOOD
SUM OF RESPONSES TO QUESTIONS 2, 4, 5, & 10: 17

## 2025-06-12 ASSESSMENT — KOOS JR
TWISING OR PIVOTING ON KNEE: SEVERE
STANDING UPRIGHT: SEVERE
KOOS JR TOTAL INTERVAL SCORE: 36.931
HOW SEVERE IS YOUR KNEE STIFFNESS AFTER FIRST WAKING IN MORNING: SEVERE
RISING FROM SITTING: SEVERE
STRAIGHTENING KNEE FULLY: MODERATE
GOING UP OR DOWN STAIRS: SEVERE
BENDING TO THE FLOOR TO PICK UP OBJECT: SEVERE

## 2025-06-12 NOTE — PERIOP NOTE
Instructions for your surgery at [facility]      Today's Date:  6/12/2025      Patient's Name:  Mode Griffin           Surgery Date:  06/17/2025              Please enter the main entrance of the hospital and check-in at the front security desk located in the lobby. They will direct you to the area to report for your surgery.     Do NOT eat or drink anything, including candy, gum, or ice chips after midnight prior to your surgery, unless you have specific instructions from your surgeon or anesthesia provider to do so.  Brush your teeth before coming to the hospital. You may swish with water, but do not swallow.  No smoking/Vaping/E-Cigarettes 24 hours prior to the day of surgery.  No alcohol 24 hours prior to the day of surgery.  No recreational drugs for one week prior to the day of surgery.  Bring Photo ID, Insurance information, and Co-pay if required on day of surgery.  Bring in pertinent legal documents, such as, Medical Power of , DNR, Advance Directive, etc.  Leave all valuables, including money/purse, weapons at home.  Remove all jewelry, including ALL body piercings, nail polish, acrylic nails, and makeup (including mascara); no lotions, powders, deodorant, or perfume/cologne/after shave on the skin.  Follow instruction for Hibiclens washes and CHG wipes from surgeon's office.   Glasses and dentures may be worn to the hospital. They must be removed prior to surgery. Please bring case/container for glasses or dentures.   Contact lenses should not be worn on day of surgery.   Call your doctor's office if symptoms of a cold or illness develop within 24-48 hours prior to your surgery.  Call your doctor's office if you have any questions concerning insurance or co-pays.  15. AN ADULT (relative or friend 18 years or older) MUST DRIVE YOU HOME AFTER YOUR SURGERY.  16. Please make arrangements for a responsible adult (18 years or older) to be with you for 24 hours after your

## 2025-06-16 ENCOUNTER — ANESTHESIA EVENT (OUTPATIENT)
Facility: HOSPITAL | Age: 70
End: 2025-06-16
Payer: OTHER GOVERNMENT

## 2025-06-17 ENCOUNTER — HOSPITAL ENCOUNTER (OUTPATIENT)
Facility: HOSPITAL | Age: 70
Discharge: HOME | End: 2025-06-17
Attending: SPECIALIST | Admitting: SPECIALIST
Payer: OTHER GOVERNMENT

## 2025-06-17 ENCOUNTER — ANESTHESIA (OUTPATIENT)
Facility: HOSPITAL | Age: 70
End: 2025-06-17
Payer: OTHER GOVERNMENT

## 2025-06-17 VITALS
RESPIRATION RATE: 16 BRPM | WEIGHT: 212.08 LBS | BODY MASS INDEX: 28.11 KG/M2 | DIASTOLIC BLOOD PRESSURE: 86 MMHG | HEART RATE: 83 BPM | SYSTOLIC BLOOD PRESSURE: 133 MMHG | TEMPERATURE: 97.4 F | HEIGHT: 73 IN | OXYGEN SATURATION: 99 %

## 2025-06-17 DIAGNOSIS — G89.18 ACUTE POST-OPERATIVE PAIN: Primary | ICD-10-CM

## 2025-06-17 DIAGNOSIS — Z96.652 S/P TKR (TOTAL KNEE REPLACEMENT), LEFT: ICD-10-CM

## 2025-06-17 PROCEDURE — C1776 JOINT DEVICE (IMPLANTABLE): HCPCS | Performed by: SPECIALIST

## 2025-06-17 PROCEDURE — 2500000003 HC RX 250 WO HCPCS: Performed by: ANESTHESIOLOGY

## 2025-06-17 PROCEDURE — 6360000002 HC RX W HCPCS: Performed by: ANESTHESIOLOGY

## 2025-06-17 PROCEDURE — 97161 PT EVAL LOW COMPLEX 20 MIN: CPT

## 2025-06-17 PROCEDURE — 7100000001 HC PACU RECOVERY - ADDTL 15 MIN: Performed by: SPECIALIST

## 2025-06-17 PROCEDURE — 3600000014 HC SURGERY LEVEL 4 ADDTL 15MIN: Performed by: SPECIALIST

## 2025-06-17 PROCEDURE — 2709999900 HC NON-CHARGEABLE SUPPLY: Performed by: SPECIALIST

## 2025-06-17 PROCEDURE — C1713 ANCHOR/SCREW BN/BN,TIS/BN: HCPCS | Performed by: SPECIALIST

## 2025-06-17 PROCEDURE — 94761 N-INVAS EAR/PLS OXIMETRY MLT: CPT

## 2025-06-17 PROCEDURE — 6370000000 HC RX 637 (ALT 250 FOR IP): Performed by: SPECIALIST

## 2025-06-17 PROCEDURE — 3700000001 HC ADD 15 MINUTES (ANESTHESIA): Performed by: SPECIALIST

## 2025-06-17 PROCEDURE — 2500000003 HC RX 250 WO HCPCS: Performed by: SPECIALIST

## 2025-06-17 PROCEDURE — 6360000002 HC RX W HCPCS: Performed by: SPECIALIST

## 2025-06-17 PROCEDURE — 6360000002 HC RX W HCPCS: Performed by: PHYSICIAN ASSISTANT

## 2025-06-17 PROCEDURE — 2700000000 HC OXYGEN THERAPY PER DAY

## 2025-06-17 PROCEDURE — 2720000010 HC SURG SUPPLY STERILE: Performed by: SPECIALIST

## 2025-06-17 PROCEDURE — 2500000003 HC RX 250 WO HCPCS: Performed by: PHYSICIAN ASSISTANT

## 2025-06-17 PROCEDURE — 6370000000 HC RX 637 (ALT 250 FOR IP): Performed by: PHYSICIAN ASSISTANT

## 2025-06-17 PROCEDURE — 7100000000 HC PACU RECOVERY - FIRST 15 MIN: Performed by: SPECIALIST

## 2025-06-17 PROCEDURE — 64447 NJX AA&/STRD FEMORAL NRV IMG: CPT | Performed by: ANESTHESIOLOGY

## 2025-06-17 PROCEDURE — 97165 OT EVAL LOW COMPLEX 30 MIN: CPT

## 2025-06-17 PROCEDURE — 6370000000 HC RX 637 (ALT 250 FOR IP): Performed by: NURSE ANESTHETIST, CERTIFIED REGISTERED

## 2025-06-17 PROCEDURE — 27447 TOTAL KNEE ARTHROPLASTY: CPT | Performed by: SPECIALIST

## 2025-06-17 PROCEDURE — 2580000003 HC RX 258: Performed by: SPECIALIST

## 2025-06-17 PROCEDURE — 3700000000 HC ANESTHESIA ATTENDED CARE: Performed by: SPECIALIST

## 2025-06-17 PROCEDURE — 97163 PT EVAL HIGH COMPLEX 45 MIN: CPT

## 2025-06-17 PROCEDURE — 6360000002 HC RX W HCPCS: Performed by: NURSE ANESTHETIST, CERTIFIED REGISTERED

## 2025-06-17 PROCEDURE — 97116 GAIT TRAINING THERAPY: CPT

## 2025-06-17 PROCEDURE — C1729 CATH, DRAINAGE: HCPCS | Performed by: SPECIALIST

## 2025-06-17 PROCEDURE — 3600000004 HC SURGERY LEVEL 4 BASE: Performed by: SPECIALIST

## 2025-06-17 PROCEDURE — 2580000003 HC RX 258: Performed by: PHYSICIAN ASSISTANT

## 2025-06-17 PROCEDURE — 2580000003 HC RX 258: Performed by: NURSE ANESTHETIST, CERTIFIED REGISTERED

## 2025-06-17 DEVICE — METACEM-X HV: Type: IMPLANTABLE DEVICE | Site: KNEE | Status: FUNCTIONAL

## 2025-06-17 DEVICE — TIBIAL INSERT PS FIXED 10MM, SIZE 5
Type: IMPLANTABLE DEVICE | Site: KNEE | Status: FUNCTIONAL
Brand: GMK PRIMARY TOTAL KNEE SYSTEM

## 2025-06-17 DEVICE — FIXED TIBIAL TRAY CEMENTED LEFT, SIZE 5
Type: IMPLANTABLE DEVICE | Site: KNEE | Status: FUNCTIONAL
Brand: GMK PRIMARY TOTAL KNEE SYSTEM

## 2025-06-17 DEVICE — FEMUR PS CEMENTED LEFT, SIZE 5
Type: IMPLANTABLE DEVICE | Site: KNEE | Status: FUNCTIONAL
Brand: GMK PRIMARY TOTAL KNEE SYSTEM

## 2025-06-17 RX ORDER — MEPERIDINE HYDROCHLORIDE 25 MG/ML
12.5 INJECTION INTRAMUSCULAR; INTRAVENOUS; SUBCUTANEOUS EVERY 5 MIN PRN
Status: DISCONTINUED | OUTPATIENT
Start: 2025-06-17 | End: 2025-06-17 | Stop reason: HOSPADM

## 2025-06-17 RX ORDER — DEXMEDETOMIDINE HYDROCHLORIDE 100 UG/ML
INJECTION, SOLUTION INTRAVENOUS
Status: DISCONTINUED | OUTPATIENT
Start: 2025-06-17 | End: 2025-06-17 | Stop reason: SDUPTHER

## 2025-06-17 RX ORDER — ASPIRIN 81 MG/1
81 TABLET ORAL 2 TIMES DAILY
Qty: 30 TABLET | Refills: 3 | Status: SHIPPED | OUTPATIENT
Start: 2025-06-17

## 2025-06-17 RX ORDER — FENTANYL CITRATE 50 UG/ML
50 INJECTION, SOLUTION INTRAMUSCULAR; INTRAVENOUS EVERY 5 MIN PRN
Status: DISCONTINUED | OUTPATIENT
Start: 2025-06-17 | End: 2025-06-17 | Stop reason: HOSPADM

## 2025-06-17 RX ORDER — TRAMADOL HYDROCHLORIDE 50 MG/1
50 TABLET ORAL EVERY 6 HOURS PRN
Qty: 28 TABLET | Refills: 0 | Status: SHIPPED | OUTPATIENT
Start: 2025-06-17 | End: 2025-06-24

## 2025-06-17 RX ORDER — TRANEXAMIC ACID 100 MG/ML
1000 INJECTION, SOLUTION INTRAVENOUS ONCE
Status: COMPLETED | OUTPATIENT
Start: 2025-06-17 | End: 2025-06-17

## 2025-06-17 RX ORDER — LIDOCAINE HYDROCHLORIDE 10 MG/ML
1 INJECTION, SOLUTION EPIDURAL; INFILTRATION; INTRACAUDAL; PERINEURAL
Status: COMPLETED | OUTPATIENT
Start: 2025-06-17 | End: 2025-06-17

## 2025-06-17 RX ORDER — FENTANYL CITRATE 50 UG/ML
100 INJECTION, SOLUTION INTRAMUSCULAR; INTRAVENOUS ONCE
Status: COMPLETED | OUTPATIENT
Start: 2025-06-17 | End: 2025-06-17

## 2025-06-17 RX ORDER — TAMSULOSIN HYDROCHLORIDE 0.4 MG/1
0.4 CAPSULE ORAL DAILY
Status: DISCONTINUED | OUTPATIENT
Start: 2025-06-17 | End: 2025-06-17 | Stop reason: HOSPADM

## 2025-06-17 RX ORDER — ONDANSETRON 2 MG/ML
4 INJECTION INTRAMUSCULAR; INTRAVENOUS EVERY 4 HOURS PRN
Status: DISCONTINUED | OUTPATIENT
Start: 2025-06-17 | End: 2025-06-17 | Stop reason: HOSPADM

## 2025-06-17 RX ORDER — SODIUM CHLORIDE 0.9 % (FLUSH) 0.9 %
5-40 SYRINGE (ML) INJECTION EVERY 12 HOURS SCHEDULED
Status: DISCONTINUED | OUTPATIENT
Start: 2025-06-17 | End: 2025-06-17 | Stop reason: HOSPADM

## 2025-06-17 RX ORDER — KETOROLAC TROMETHAMINE 15 MG/ML
15 INJECTION, SOLUTION INTRAMUSCULAR; INTRAVENOUS EVERY 6 HOURS PRN
Status: DISCONTINUED | OUTPATIENT
Start: 2025-06-17 | End: 2025-06-17 | Stop reason: HOSPADM

## 2025-06-17 RX ORDER — POLYETHYLENE GLYCOL 3350 17 G/17G
17 POWDER, FOR SOLUTION ORAL DAILY
Status: DISCONTINUED | OUTPATIENT
Start: 2025-06-17 | End: 2025-06-17 | Stop reason: HOSPADM

## 2025-06-17 RX ORDER — ROCURONIUM BROMIDE 10 MG/ML
INJECTION, SOLUTION INTRAVENOUS
Status: DISCONTINUED | OUTPATIENT
Start: 2025-06-17 | End: 2025-06-17 | Stop reason: SDUPTHER

## 2025-06-17 RX ORDER — ASPIRIN 81 MG/1
81 TABLET ORAL 2 TIMES DAILY
Status: DISCONTINUED | OUTPATIENT
Start: 2025-06-17 | End: 2025-06-17 | Stop reason: HOSPADM

## 2025-06-17 RX ORDER — ROPIVACAINE HYDROCHLORIDE 5 MG/ML
INJECTION, SOLUTION EPIDURAL; INFILTRATION; PERINEURAL
Status: COMPLETED | OUTPATIENT
Start: 2025-06-17 | End: 2025-06-17

## 2025-06-17 RX ORDER — SODIUM CHLORIDE 9 MG/ML
INJECTION, SOLUTION INTRAVENOUS PRN
Status: DISCONTINUED | OUTPATIENT
Start: 2025-06-17 | End: 2025-06-17 | Stop reason: HOSPADM

## 2025-06-17 RX ORDER — FAMOTIDINE 20 MG/1
20 TABLET, FILM COATED ORAL ONCE
Status: COMPLETED | OUTPATIENT
Start: 2025-06-17 | End: 2025-06-17

## 2025-06-17 RX ORDER — SODIUM CHLORIDE, SODIUM LACTATE, POTASSIUM CHLORIDE, CALCIUM CHLORIDE 600; 310; 30; 20 MG/100ML; MG/100ML; MG/100ML; MG/100ML
INJECTION, SOLUTION INTRAVENOUS CONTINUOUS
Status: DISCONTINUED | OUTPATIENT
Start: 2025-06-17 | End: 2025-06-17 | Stop reason: HOSPADM

## 2025-06-17 RX ORDER — HYDROXYZINE HYDROCHLORIDE 25 MG/1
25 TABLET, FILM COATED ORAL EVERY 8 HOURS PRN
Qty: 30 TABLET | Refills: 0 | Status: SHIPPED | OUTPATIENT
Start: 2025-06-17 | End: 2025-06-27

## 2025-06-17 RX ORDER — TRAMADOL HYDROCHLORIDE 50 MG/1
50 TABLET ORAL EVERY 6 HOURS PRN
Status: DISCONTINUED | OUTPATIENT
Start: 2025-06-17 | End: 2025-06-17 | Stop reason: HOSPADM

## 2025-06-17 RX ORDER — MAGNESIUM HYDROXIDE 1200 MG/15ML
LIQUID ORAL CONTINUOUS PRN
Status: DISCONTINUED | OUTPATIENT
Start: 2025-06-17 | End: 2025-06-17 | Stop reason: HOSPADM

## 2025-06-17 RX ORDER — SUCCINYLCHOLINE/SOD CL,ISO/PF 100 MG/5ML
SYRINGE (ML) INTRAVENOUS
Status: DISCONTINUED | OUTPATIENT
Start: 2025-06-17 | End: 2025-06-17 | Stop reason: SDUPTHER

## 2025-06-17 RX ORDER — BISACODYL 5 MG/1
5 TABLET, DELAYED RELEASE ORAL DAILY
Status: DISCONTINUED | OUTPATIENT
Start: 2025-06-17 | End: 2025-06-17 | Stop reason: HOSPADM

## 2025-06-17 RX ORDER — ACETAMINOPHEN 325 MG/1
650 TABLET ORAL
Status: COMPLETED | OUTPATIENT
Start: 2025-06-17 | End: 2025-06-17

## 2025-06-17 RX ORDER — ASPIRIN 81 MG/1
81 TABLET, CHEWABLE ORAL DAILY
Status: SHIPPED | OUTPATIENT
Start: 2025-06-17

## 2025-06-17 RX ORDER — ACETAMINOPHEN 500 MG
1000 TABLET ORAL EVERY 8 HOURS SCHEDULED
Status: DISCONTINUED | OUTPATIENT
Start: 2025-06-17 | End: 2025-06-17 | Stop reason: HOSPADM

## 2025-06-17 RX ORDER — PROMETHAZINE HYDROCHLORIDE 12.5 MG/1
12.5 TABLET ORAL ONCE
Status: COMPLETED | OUTPATIENT
Start: 2025-06-17 | End: 2025-06-17

## 2025-06-17 RX ORDER — EPHEDRINE SULFATE/0.9% NACL/PF 25 MG/5 ML
SYRINGE (ML) INTRAVENOUS
Status: DISCONTINUED | OUTPATIENT
Start: 2025-06-17 | End: 2025-06-17 | Stop reason: SDUPTHER

## 2025-06-17 RX ORDER — FENTANYL CITRATE 50 UG/ML
INJECTION, SOLUTION INTRAMUSCULAR; INTRAVENOUS
Status: DISCONTINUED | OUTPATIENT
Start: 2025-06-17 | End: 2025-06-17 | Stop reason: SDUPTHER

## 2025-06-17 RX ORDER — DEXAMETHASONE SODIUM PHOSPHATE 4 MG/ML
INJECTION, SOLUTION INTRA-ARTICULAR; INTRALESIONAL; INTRAMUSCULAR; INTRAVENOUS; SOFT TISSUE
Status: DISCONTINUED | OUTPATIENT
Start: 2025-06-17 | End: 2025-06-17 | Stop reason: SDUPTHER

## 2025-06-17 RX ORDER — PHENYLEPHRINE HCL IN 0.9% NACL 1 MG/10 ML
SYRINGE (ML) INTRAVENOUS
Status: DISCONTINUED | OUTPATIENT
Start: 2025-06-17 | End: 2025-06-17 | Stop reason: SDUPTHER

## 2025-06-17 RX ORDER — KETOROLAC TROMETHAMINE 15 MG/ML
INJECTION, SOLUTION INTRAMUSCULAR; INTRAVENOUS
Status: DISCONTINUED | OUTPATIENT
Start: 2025-06-17 | End: 2025-06-17 | Stop reason: SDUPTHER

## 2025-06-17 RX ORDER — PROPOFOL 10 MG/ML
INJECTION, EMULSION INTRAVENOUS
Status: DISCONTINUED | OUTPATIENT
Start: 2025-06-17 | End: 2025-06-17 | Stop reason: SDUPTHER

## 2025-06-17 RX ORDER — HYDROXYZINE HYDROCHLORIDE 10 MG/1
10 TABLET, FILM COATED ORAL EVERY 8 HOURS PRN
Status: DISCONTINUED | OUTPATIENT
Start: 2025-06-17 | End: 2025-06-17 | Stop reason: HOSPADM

## 2025-06-17 RX ORDER — SODIUM CHLORIDE 0.9 % (FLUSH) 0.9 %
5-40 SYRINGE (ML) INJECTION PRN
Status: DISCONTINUED | OUTPATIENT
Start: 2025-06-17 | End: 2025-06-17 | Stop reason: HOSPADM

## 2025-06-17 RX ORDER — NALOXONE HYDROCHLORIDE 0.4 MG/ML
INJECTION, SOLUTION INTRAMUSCULAR; INTRAVENOUS; SUBCUTANEOUS PRN
Status: DISCONTINUED | OUTPATIENT
Start: 2025-06-17 | End: 2025-06-17 | Stop reason: HOSPADM

## 2025-06-17 RX ORDER — OXYCODONE HYDROCHLORIDE 5 MG/1
5 TABLET ORAL EVERY 4 HOURS PRN
Status: DISCONTINUED | OUTPATIENT
Start: 2025-06-17 | End: 2025-06-17 | Stop reason: HOSPADM

## 2025-06-17 RX ORDER — GLYCOPYRROLATE 0.2 MG/ML
INJECTION INTRAMUSCULAR; INTRAVENOUS
Status: DISCONTINUED | OUTPATIENT
Start: 2025-06-17 | End: 2025-06-17 | Stop reason: SDUPTHER

## 2025-06-17 RX ORDER — GABAPENTIN 100 MG/1
300 CAPSULE ORAL
Status: COMPLETED | OUTPATIENT
Start: 2025-06-17 | End: 2025-06-17

## 2025-06-17 RX ORDER — MIDAZOLAM HYDROCHLORIDE 2 MG/2ML
2 INJECTION, SOLUTION INTRAMUSCULAR; INTRAVENOUS ONCE
Status: COMPLETED | OUTPATIENT
Start: 2025-06-17 | End: 2025-06-17

## 2025-06-17 RX ORDER — ONDANSETRON 2 MG/ML
4 INJECTION INTRAMUSCULAR; INTRAVENOUS
Status: DISCONTINUED | OUTPATIENT
Start: 2025-06-17 | End: 2025-06-17 | Stop reason: HOSPADM

## 2025-06-17 RX ORDER — PROCHLORPERAZINE EDISYLATE 5 MG/ML
5 INJECTION INTRAMUSCULAR; INTRAVENOUS
Status: DISCONTINUED | OUTPATIENT
Start: 2025-06-17 | End: 2025-06-17 | Stop reason: HOSPADM

## 2025-06-17 RX ORDER — OXYCODONE HYDROCHLORIDE 5 MG/1
5 TABLET ORAL EVERY 4 HOURS PRN
Qty: 28 TABLET | Refills: 0 | Status: SHIPPED | OUTPATIENT
Start: 2025-06-17 | End: 2025-06-24

## 2025-06-17 RX ORDER — DOCUSATE SODIUM 100 MG/1
100 CAPSULE, LIQUID FILLED ORAL DAILY PRN
Qty: 30 CAPSULE | Refills: 0 | Status: SHIPPED | OUTPATIENT
Start: 2025-06-17

## 2025-06-17 RX ORDER — NEOSTIGMINE METHYLSULFATE 1 MG/ML
INJECTION, SOLUTION INTRAVENOUS
Status: DISCONTINUED | OUTPATIENT
Start: 2025-06-17 | End: 2025-06-17 | Stop reason: SDUPTHER

## 2025-06-17 RX ORDER — ONDANSETRON 2 MG/ML
INJECTION INTRAMUSCULAR; INTRAVENOUS
Status: DISCONTINUED | OUTPATIENT
Start: 2025-06-17 | End: 2025-06-17 | Stop reason: SDUPTHER

## 2025-06-17 RX ORDER — DEXTROSE, SODIUM CHLORIDE, SODIUM LACTATE, POTASSIUM CHLORIDE, AND CALCIUM CHLORIDE 5; .6; .31; .03; .02 G/100ML; G/100ML; G/100ML; G/100ML; G/100ML
INJECTION, SOLUTION INTRAVENOUS CONTINUOUS
Status: DISCONTINUED | OUTPATIENT
Start: 2025-06-17 | End: 2025-06-17 | Stop reason: HOSPADM

## 2025-06-17 RX ORDER — ROPIVACAINE HYDROCHLORIDE 5 MG/ML
30 INJECTION, SOLUTION EPIDURAL; INFILTRATION; PERINEURAL ONCE
Status: COMPLETED | OUTPATIENT
Start: 2025-06-17 | End: 2025-06-17

## 2025-06-17 RX ORDER — LIDOCAINE HYDROCHLORIDE 20 MG/ML
INJECTION, SOLUTION EPIDURAL; INFILTRATION; INTRACAUDAL; PERINEURAL
Status: DISCONTINUED | OUTPATIENT
Start: 2025-06-17 | End: 2025-06-17 | Stop reason: SDUPTHER

## 2025-06-17 RX ORDER — IPRATROPIUM BROMIDE AND ALBUTEROL SULFATE 2.5; .5 MG/3ML; MG/3ML
1 SOLUTION RESPIRATORY (INHALATION)
Status: DISCONTINUED | OUTPATIENT
Start: 2025-06-17 | End: 2025-06-17 | Stop reason: HOSPADM

## 2025-06-17 RX ADMIN — FAMOTIDINE 20 MG: 20 TABLET, FILM COATED ORAL at 06:29

## 2025-06-17 RX ADMIN — ROCURONIUM BROMIDE 30 MG: 10 INJECTION, SOLUTION INTRAVENOUS at 08:09

## 2025-06-17 RX ADMIN — GLYCOPYRROLATE 0.4 MG: 0.2 INJECTION INTRAMUSCULAR; INTRAVENOUS at 10:30

## 2025-06-17 RX ADMIN — Medication 2 MG: at 10:30

## 2025-06-17 RX ADMIN — TRANEXAMIC ACID 1000 MG: 100 INJECTION, SOLUTION INTRAVENOUS at 10:24

## 2025-06-17 RX ADMIN — Medication 100 MG: at 07:51

## 2025-06-17 RX ADMIN — EPHEDRINE SULFATE 10 MG: 5 INJECTION INTRAVENOUS at 07:55

## 2025-06-17 RX ADMIN — Medication 200 MCG: at 08:05

## 2025-06-17 RX ADMIN — ROPIVACAINE HYDROCHLORIDE 30 ML: 5 INJECTION EPIDURAL; INFILTRATION; PERINEURAL at 07:05

## 2025-06-17 RX ADMIN — DEXAMETHASONE SODIUM PHOSPHATE 4 MG: 4 INJECTION, SOLUTION INTRAMUSCULAR; INTRAVENOUS at 08:06

## 2025-06-17 RX ADMIN — SODIUM CHLORIDE, SODIUM LACTATE, POTASSIUM CHLORIDE, AND CALCIUM CHLORIDE: .6; .31; .03; .02 INJECTION, SOLUTION INTRAVENOUS at 06:30

## 2025-06-17 RX ADMIN — MIDAZOLAM HYDROCHLORIDE 2 MG: 1 INJECTION, SOLUTION INTRAMUSCULAR; INTRAVENOUS at 07:05

## 2025-06-17 RX ADMIN — GABAPENTIN 300 MG: 100 CAPSULE ORAL at 06:30

## 2025-06-17 RX ADMIN — Medication 200 MCG: at 10:23

## 2025-06-17 RX ADMIN — FENTANYL CITRATE 50 MCG: 50 INJECTION INTRAMUSCULAR; INTRAVENOUS at 10:02

## 2025-06-17 RX ADMIN — ACETAMINOPHEN 1000 MG: 500 TABLET ORAL at 14:34

## 2025-06-17 RX ADMIN — Medication 200 MCG: at 10:40

## 2025-06-17 RX ADMIN — WATER 2000 MG: 1 INJECTION INTRAMUSCULAR; INTRAVENOUS; SUBCUTANEOUS at 08:05

## 2025-06-17 RX ADMIN — DEXMEDETOMIDINE 8 MCG: 200 INJECTION, SOLUTION INTRAVENOUS at 08:41

## 2025-06-17 RX ADMIN — KETOROLAC TROMETHAMINE 15 MG: 15 INJECTION, SOLUTION INTRAMUSCULAR; INTRAVENOUS at 10:12

## 2025-06-17 RX ADMIN — LIDOCAINE HYDROCHLORIDE 100 MG: 20 INJECTION, SOLUTION EPIDURAL; INFILTRATION; INTRACAUDAL; PERINEURAL at 07:51

## 2025-06-17 RX ADMIN — LIDOCAINE HYDROCHLORIDE 1 ML: 10 INJECTION, SOLUTION EPIDURAL; INFILTRATION; INTRACAUDAL; PERINEURAL at 07:05

## 2025-06-17 RX ADMIN — Medication 200 MCG: at 10:15

## 2025-06-17 RX ADMIN — DEXMEDETOMIDINE 4 MCG: 200 INJECTION, SOLUTION INTRAVENOUS at 07:51

## 2025-06-17 RX ADMIN — PROMETHAZINE HYDROCHLORIDE 12.5 MG: 12.5 TABLET ORAL at 06:29

## 2025-06-17 RX ADMIN — TRANEXAMIC ACID 1000 MG: 100 INJECTION, SOLUTION INTRAVENOUS at 07:56

## 2025-06-17 RX ADMIN — FENTANYL CITRATE 100 MCG: 50 INJECTION INTRAMUSCULAR; INTRAVENOUS at 07:05

## 2025-06-17 RX ADMIN — ONDANSETRON 4 MG: 2 INJECTION, SOLUTION INTRAMUSCULAR; INTRAVENOUS at 10:09

## 2025-06-17 RX ADMIN — PROPOFOL 150 MG: 10 INJECTION, EMULSION INTRAVENOUS at 07:51

## 2025-06-17 RX ADMIN — FENTANYL CITRATE 50 MCG: 50 INJECTION INTRAMUSCULAR; INTRAVENOUS at 08:46

## 2025-06-17 RX ADMIN — VANCOMYCIN HYDROCHLORIDE 1000 MG: 1 INJECTION, POWDER, LYOPHILIZED, FOR SOLUTION INTRAVENOUS at 06:21

## 2025-06-17 RX ADMIN — EPHEDRINE SULFATE 10 MG: 5 INJECTION INTRAVENOUS at 08:00

## 2025-06-17 RX ADMIN — DEXMEDETOMIDINE 8 MCG: 200 INJECTION, SOLUTION INTRAVENOUS at 07:44

## 2025-06-17 RX ADMIN — ACETAMINOPHEN 650 MG: 325 TABLET ORAL at 06:29

## 2025-06-17 RX ADMIN — ROCURONIUM BROMIDE 10 MG: 10 INJECTION, SOLUTION INTRAVENOUS at 07:51

## 2025-06-17 ASSESSMENT — PAIN - FUNCTIONAL ASSESSMENT: PAIN_FUNCTIONAL_ASSESSMENT: 0-10

## 2025-06-17 ASSESSMENT — PAIN SCALES - GENERAL
PAINLEVEL_OUTOF10: 0
PAINLEVEL_OUTOF10: 3
PAINLEVEL_OUTOF10: 0

## 2025-06-17 NOTE — ANESTHESIA POSTPROCEDURE EVALUATION
Department of Anesthesiology  Postprocedure Note    Patient: Mode Griffin  MRN: [id number]  YOB: 1955  Date of evaluation: 6/17/2025    Procedure Summary       Date: 06/17/25 Room / Location: [facility] MAIN 06 / [facility] MAIN OR    Anesthesia Start: 0744 Anesthesia Stop: 1106    Procedure: LEFT PRIMARY TOTAL KNEE REPLACEMENT [KYOCERA] [MEDACTA ORTHO] WITH ADDUCTOR CANAL BLOCK 2 SA'S ERAS (Left: Knee) Diagnosis:       Degenerative arthritis of left knee      (Degenerative arthritis of left knee [M17.12])    Surgeons: Ishmael Anderson MD Responsible Provider: Willis Rodriguez MD    Anesthesia Type: General ASA Status: 3            Anesthesia Type: General    Peterson Phase I: Peterson Score: 10    Peterson Phase II:      Anesthesia Post Evaluation    Patient location during evaluation: PACU  Patient participation: complete - patient participated  Level of consciousness: awake  Airway patency: patent  Nausea & Vomiting: no nausea  Cardiovascular status: hemodynamically stable  Respiratory status: acceptable  Hydration status: euvolemic  Pain management: adequate    No notable events documented.

## 2025-06-17 NOTE — ANESTHESIA PRE PROCEDURE
infusion   IntraVENous Continuous Brein, [place], APRN - CRNA        midazolam PF (VERSED) injection 2 mg  2 mg IntraVENous Once Brein, Angélica, APRN - CRNA        fentaNYL (SUBLIMAZE) injection 100 mcg  100 mcg IntraVENous Once Brein, [place], APRN - CRNA        ROPivacaine (NAROPIN) 0.5% injection 30 mL  30 mL Other Once Brein, [place], APRN - CRNA        BUPivacaine liposome (EXPAREL) 1.3 % injection 66.5 mg  5 mL SubCUTAneous Once Rafael Quezada PA-C        ceFAZolin (ANCEF) 2,000 mg in sterile water 20 mL IV syringe  2,000 mg IntraVENous On Call to OR Rafael Quezada PA-C        tranexamic acid (CYKLOKAPRON) injection 1,000 mg  1,000 mg IntraVENous Once Rafael Quezada PA-C        vancomycin (VANCOCIN) 1,000 mg in sodium chloride 0.9 % 250 mL IVPB ([id number])  1,000 mg IntraVENous Once Rafael Quezada PA-C 250 mL/hr at 06/17/25 0621 1,000 mg at 06/17/25 0621       Allergies:  No Known Allergies    Problem List:    Patient Active Problem List   Diagnosis Code    Chest pain, unspecified R07.9    Shortness of breath R06.02    Right knee pain M25.561    Arthritis M19.90    Primary osteoarthritis of right knee M17.11    S/P total knee arthroplasty, right Z96.651    Degenerative arthritis of left knee M17.12       Past Medical History:        Diagnosis Date    Arthritis     knee    BPH (benign prostatic hyperplasia)     Cancer (HCC) 2019    prostate - had radiation    Chest pain, unspecified 9/10/12    possible angina, chest wall, GERD; Pt has difficulty in exercising due to knee arthritis; Symptoms include chest pain.  The pain is located in the (L) anterior chest and (R) anterior chest.  There is no radiation.  The pt describes the pain as sharp.  Onset was gradual 4 week(s) ago.    Hypercholesteremia     Hypertension     Shortness of breath     R/O CHF, CMP; The onset of the sob has been gradual.  It has been occurring in an episodic pattern for weeks. The course has been increasing and the severity level is mild.

## 2025-06-17 NOTE — BRIEF OP NOTE
Brief Postoperative Note      Patient: Mode Griffin  YOB: 1955  MRN: [id number]    Date of Procedure: 6/17/2025    Pre-Op Diagnosis Codes:      * Degenerative arthritis of left knee [M17.12]    Post-Op Diagnosis: Same       Procedure(s):  LEFT PRIMARY TOTAL KNEE REPLACEMENT [KYOCERA] [MEDACTA ORTHO] WITH ADDUCTOR CANAL BLOCK 2 [facility] MONSES    Surgeon(s):  Ishmael Anderson MD    Assistant:  Surgical Assistant: Jasmin Lane    Anesthesia: General    Estimated Blood Loss (mL): less than 50     Complications: None    Specimens:   * No specimens in log *    Implants:  Implant Name Type Inv. Item Serial No.  Lot No. LRB No. Used Action   METACEM-X HV - [id number]  METACEM-X HV [id number] MEDACTA [place]-WD [id number] Left 1 Implanted   EXTENSION STEM   [id number]  [id number] Left 1 Implanted   COMPONENT FEM SZ 5 LT POST STBL MADINA GMK - [id number]  COMPONENT FEM SZ 5 LT POST STBL MADINA GMK [id number] MEDACTA [place]-WD [id number] Left 1 Implanted   COMPONENT TIB SZ 5 LT FIX MADINA GMK - [id number]  COMPONENT TIB SZ 5 LT FIX MADINA GMK [id number] MEDACTA [place]-WD [id number] Left 1 Implanted   INSERT TIB SZ 5 [id number] POST STBL FIX GMK - [id number]  INSERT TIB SZ 5 [id number] POST STBL FIX GMK [id number] MEDACTA [employer]WD [id number] Left 1 Implanted         Drains:   Urinary Catheter 06/17/25 Other (comment) (Active)       Findings:  Infection Present At Time Of Surgery (PATOS) (choose all levels that have infection present):  No infection present  Other Findings: above    Electronically signed by Ishmael Anderson MD on 6/17/2025 at 10:35 AM

## 2025-06-17 NOTE — ANESTHESIA PROCEDURE NOTES
Peripheral Block    Patient location during procedure: pre-op  Reason for block: post-op pain management and at surgeon's request  Start time: 6/17/2025 7:05 AM  End time: 6/17/2025 7:11 AM  Staffing  Performed: anesthesiologist   Performed by: Willis Rodriguez MD  Authorized by: Willis Rodriguez MD    Preanesthetic Checklist  Completed: patient identified, IV checked, site marked, risks and benefits discussed, surgical/procedural consents, equipment checked, pre-op evaluation, timeout performed, anesthesia consent given, oxygen available, monitors applied/VS acknowledged, fire risk safety assessment completed and verbalized and blood product R/B/A discussed and consented  Peripheral Block   Patient position: supine  Prep: ChloraPrep  Provider prep: mask and sterile gloves  Patient monitoring: cardiac monitor, continuous pulse ox, frequent blood pressure checks, IV access, oxygen and responsive to questions  Block type: Femoral  Adductor canal  Laterality: left  Injection technique: single-shot  Guidance: ultrasound guided  Local infiltration: lidocaine  Local infiltration: lidocaine  Dose: 2 mL    Needle   Needle type: insulated echogenic nerve stimulator needle   Needle gauge: 21 G  Needle localization: ultrasound guidance  Needle length: 10 cm  Assessment   Injection assessment: negative aspiration for heme, no paresthesia on injection, local visualized surrounding nerve on ultrasound and no intravascular symptoms  Paresthesia pain: none  Slow fractionated injection: yes  Hemodynamics: stable  Outcomes: uncomplicated and patient tolerated procedure well    Medications Administered  ropivacaine (NAROPIN) injection 0.5% - Perineural   30 mL - 6/17/2025 7:05:00 AM

## 2025-06-17 NOTE — PROGRESS NOTES
OCCUPATIONAL THERAPY EVALUATION/DISCHARGE    Patient: Mode Griffin (69 y.o. male)  Date: 6/17/2025  Primary Diagnosis: Degenerative arthritis of left knee [M17.12]  S/P TKR (total knee replacement), left [Z96.652]  Procedure(s) (LRB):  LEFT PRIMARY TOTAL KNEE REPLACEMENT [KYOCERA] [MEDACTA ORTHO] WITH ADDUCTOR CANAL BLOCK 2 SA'S ERAS (Left) * Day of Surgery *   Precautions: Weight Bearing, General Precautions,  , Left Lower Extremity Weight Bearing: Weight Bearing As Tolerated,  PLOF: Patient was independent with basic self care tasks and used no AD for functional mobility PTA.      ASSESSMENT AND RECOMMENDATIONS:  Patient is able to perform basic self care tasks without assistance.  Supervision given for functional standing and transfers.  Will defer to PT for mobility training.  Patient has a supportive family at home to assist him prn.  Discussed use of a seat in the shower for safety and patient to purchase upon discharge if needed.     Maximum therapeutic gains met at current level of care and patient will be discharged from occupational therapy at this time.    Further Equipment Recommendations for Discharge: rolling walker    AMPAC: AM-PAC Inpatient Daily Activity Raw Score: 21    Current research shows that an AM-PAC score of 18 or greater is associated with a discharge to the patient's home setting.    This AMPAC score should be considered in conjunction with interdisciplinary team recommendations to determine the most appropriate discharge setting. Patient's social support, diagnosis, medical stability, and prior level of function should also be taken into consideration.     SUBJECTIVE:   Patient stated “I need to urinate.”    OBJECTIVE DATA SUMMARY:     Past Medical History:   Diagnosis Date    Arthritis     knee    BPH (benign prostatic hyperplasia)     Cancer (HCC) 2019    prostate - had radiation    Chest pain, unspecified 9/10/12    possible angina, chest wall, GERD; Pt has difficulty in exercising 
Patient received from PACU. Patient is awake and alert. Patient ambulated from the stretcher to the chair. Patient alert and oriented to room and how to use the call bell. Daughter at bedside.    3795 Patient still unable to be discharged still waiting on pharmacy to finish with meds.  
anterior chest and (R) anterior chest.  There is no radiation.  The pt describes the pain as sharp.  Onset was gradual 4 week(s) ago.    Hypercholesteremia     Hypertension     Shortness of breath     R/O CHF, CMP; The onset of the sob has been gradual.  It has been occurring in an episodic pattern for weeks. The course has been increasing and the severity level is mild.  The sob occurs when climbing stairs.     Past Surgical History:   Procedure Laterality Date    COLONOSCOPY      HERNIA REPAIR      ORTHOPEDIC SURGERY      knee arthroscopy    TOTAL KNEE ARTHROPLASTY Right 6/18/2024    RIGHT TOTAL KNEE REPLACEMENT performed by Ishmael Anderson MD at [facility] MAIN OR    UROLOGICAL SURGERY  06/03/2019    TRUS Volume:  31 cm3 .  [place] 8 (4+4) R [place].  PSA 7.0  Dr Anglin       Home Situation:     Critical Behavior:  Orientation  Overall Orientation Status: Within Normal Limits  Orientation Level: Oriented X4  Cognition  Overall Cognitive Status: WNL    Strength:    Strength: Within functional limits    Tone & Sensation:   Tone: Normal  Sensation: Intact    Range Of Motion:  AROM: Generally decreased, functional       Functional Mobility:  Bed Mobility:     Bed Mobility Training  Bed Mobility Training: No  Transfers:     Transfer Training  Transfer Training: Yes  Sit to Stand: Contact guard assistance  Stand to Sit: Stand by assistance  Balance:     Balance  Sitting: Intact  Standing: Impaired;With support  Standing - Static: Good  Standing - Dynamic: Fair ((+))     Ambulation/Gait Training:    Gait  Gait Training: Yes  Overall Level of Assistance: Stand by assistance  Distance (ft): 100 Feet  Assistive Device: Walker, rolling  Speed/Jaki: Slow;Pace decreased (< 100 feet/min)  Step Length: Right shortened;Left shortened       Pain:  Intensity Pre-treatment: 6/10   Intensity Post-treatment: 6/10  Scale: Numeric Rating Scale  Location: Left Knee  Quality: Aching  Intervention(s): Nurse notified and Repositioning

## 2025-06-17 NOTE — NURSE NAVIGATOR
Rounded on patient s/p left total knee replacement with Dr. Anderson, dos 06/17/2025. Patient observed to be alert and oriented x 3, sitting up in bedside chair. He denies chest pain, shortness of breath, nausea, vomiting or calf pain. He reports his pain as controlled at present, he denies any numbness to his lower extremities. He has quadricept fire to his left leg. Ace wrap observed to left lower extremity, dressing underneath observed to be clean, dry and intact with ice pack in place for comfort. Patient was able to ambulate with steady gait and use of rolling walker to bedside chair. He is tolerating food and beverage.       Patient has had a knee replacement before so has all required DME at home and a strong support system in place His medications have been sent to the [facility] outpatient pharmacy for .        Today patient was provided with a total knee replacement education book, medication education sheet, medication schedule and frequently asked knee question hand out. He was instructed that his ace wrap may be removed tonight and should be used for compression to assist with swelling in foot and ankle during the daytime only.        Reviewed the use of incentive spirometry. Patient encouraged to use ten times hourly while in hospital and to continue use at home for the next few days to keep lungs expanded and free from complications. He was educated that lack of use could result in low grade fevers of 99- 100. He was reminded that fevers are not worrisome unless they go above 101.3 and remain despite use of incentive spirometry.             Reviewed postoperative showering instructions. Patient was reminded that he may shower on Thursday, . No tubs or submersion in water for a full six weeks. He is to contact clinic with any dressing issues. he was instructed that in the event that dressing becomes saturated he is to reinforce dressing and wrap with ace wrap and call office for dressing change or on

## 2025-06-17 NOTE — PERIOP NOTE
TRANSFER - OUT REPORT:    Telephone report given to Rosa on Mode Griffin  being transferred to [facility] for routine post-op       Report consisted of patient's Situation, Background, Assessment and   Recommendations(SBAR).     Information from the following report(s) Surgery Report and MAR was reviewed with the receiving nurse.           Lines:   Peripheral IV 06/17/25 Posterior;Right Hand (Active)   Site Assessment Clean, dry & intact 06/17/25 1230   Line Status Normal saline locked 06/17/25 1230   Line Care Connections checked and tightened 06/17/25 1230   Phlebitis Assessment No symptoms 06/17/25 1230   Infiltration Assessment 0 06/17/25 1230   Dressing Status Clean, dry & intact 06/17/25 1230   Dressing Type Transparent 06/17/25 1230        Opportunity for questions and clarification was provided.      Patient transported with:  Tech

## 2025-06-17 NOTE — CARE COORDINATION
PAPA spoke with Chris ortho coordinator, to confirm if home health was already arranged for the patient, per Chris she is unsure if it was and patient isnt sure.    [facility] @ [phone]  - [employer] [phone]  and spoke with Ora  who gave patient  as Ms Carina Santamaria,ext [phone]     PAPA left voice message for return call.

## 2025-06-17 NOTE — OP NOTE
Operative Note      Patient: Mode Griffin     Date of Surgery: 6/17/2025         YOB: 1955      Age:  69 y.o.        LOS:  LOS: 0 days       Preoperative Diagnosis:  Degenerative arthritis of left knee [M17.12]    Postoperative Diagnosis: Same     Surgeon:  Ishmael Anderson MD     Assistant:  Jasmin Lane     Anesthesia:  general anesthesia and adductor block     Procedure:  Procedure(s):  LEFT PRIMARY TOTAL KNEE REPLACEMENT [KYOCERA] [MEDACTA ORTHO] WITH ADDUCTOR CANAL BLOCK 2 SA'S ERAS    Time out performed: YES    Estimated Blood Loss:  min           Implants:    Implant Name Type Inv. Item Serial No.  Lot No. LRB No. Used Action   METACEM-X HV - [id number]  METACEM-X HV [id number] MEDACTA [place]-WD [id number] Left 1 Implanted   EXTENSION STEM   [id number]  [id number] Left 1 Implanted   COMPONENT FEM SZ 5 LT POST STBL MADINA GMK - [id number]  COMPONENT FEM SZ 5 LT POST STBL MADINA GMK [id number] MEDACTA USA-WD [id number] Left 1 Implanted   COMPONENT TIB SZ 5 LT FIX MADINA GMK - [id number]  COMPONENT TIB SZ 5 LT FIX MADINA GMK [id number] MEDACTA [place]-WD [id number] Left 1 Implanted   INSERT TIB SZ 5 [id number] POST STBL FIX GMK - [id number]  INSERT TIB SZ 5 [id number] POST STBL FIX GMK [id number] MEDACTA [employer] [id number] Left 1 Implanted       Specimens: * No specimens in log *            Complications:  None    DESCRIPTION OF PROCEDURE: After satisfactory general and adductor block anesthesia, in the supine position, patient's knee was prepped with ChloraPrep solution and draped in the usual fashion for knee replacement. The tourniquet was inflated to 350 mmHg after exsanguination and elevation. A longitudinal anterior skin incision was made carried down through the subcutaneus tissue to the underlying extensor mechanism. A medial parapatellar arthrotomy was carried proximally in a subvastus approach sparing the vastus medialis obliqus insertion on the quadriceps tendon. The patella was everted and retracted

## 2025-06-17 NOTE — INTERVAL H&P NOTE
Update History & Physical    The patient's History and Physical of June 17, 2025 was reviewed with the patient and I examined the patient. There was no change. The surgical site was confirmed by the patient and me.     Plan: The risks, benefits, expected outcome, and alternative to the recommended procedure have been discussed with the patient. Patient understands and wants to proceed with the procedure.     Electronically signed by Ishmael Anderson MD on 6/17/2025 at 6:56 AM

## 2025-06-18 NOTE — CARE COORDINATION
6/18/25 @1244     PAPA spoke with Karishma [facility] with the [facility] @  [phone], per Karishma she doesn't see any home health arranged for the patient, she also stated she doesn't see the clinicals that was faxed yesterday.       1519  [facility].     alli Perez coordinator aware.

## 2025-06-20 ENCOUNTER — TELEPHONE (OUTPATIENT)
Facility: HOSPITAL | Age: 70
End: 2025-06-20

## 2025-06-20 NOTE — TELEPHONE ENCOUNTER
Call placed to patient,ID verified x 2. Patient is s/p left total knee replacement with Dr. Anderson, dos 06/17/2025. He denies chest pain, shortness of breath, nausea, vomiting, fever, chills or calf pain. He denies any residual numbness in his left lower extremity, he denies any difficulty with bladder or bowel. He reports that his pain has been controlled, he is just stiff. He is ambulating hourly with his walker and working on his quad sets. Today he began working on his bend as the swelling has started to subside. He is icing and elevating to assist with pain and swelling. He reports his dressing as clean, dry and intact. He has not heard from home health . Note from case management indicates that [facility]. Patient states that he spoke with who provided his home care last time and they told him they are working on authorization. Patient instructed to call coordinator if this is not set up by Monday so that an outpatient physical therapy order can be placed. Patient verbalized that he would call. Overall patient feels he is doing well. He has no questions or concerns at this time. He will follow up with Dr. Anderson in two weeks or sooner if needed.

## 2025-06-25 ENCOUNTER — OFFICE VISIT (OUTPATIENT)
Age: 70
End: 2025-06-25

## 2025-06-25 VITALS — HEIGHT: 72 IN | BODY MASS INDEX: 28.71 KG/M2 | WEIGHT: 212 LBS

## 2025-06-25 DIAGNOSIS — G89.18 ACUTE POST-OPERATIVE PAIN: ICD-10-CM

## 2025-06-25 PROCEDURE — 99024 POSTOP FOLLOW-UP VISIT: CPT | Performed by: PHYSICIAN ASSISTANT

## 2025-06-25 RX ORDER — OXYCODONE HYDROCHLORIDE 5 MG/1
5 TABLET ORAL EVERY 4 HOURS PRN
Qty: 28 TABLET | Refills: 0 | Status: SHIPPED | OUTPATIENT
Start: 2025-06-25 | End: 2025-07-02

## 2025-06-25 NOTE — PROGRESS NOTES
Mode Griffin returns to the office for wound check status post left primary total knee replacement 8 days ago.  He is full weightbearing of his left lower extremity.  His honeycomb OpSite dressing was removed today to reveal a craniocaudal oriented surgical incision measuring 21 cm [id number].  Surgical staples are noted.  Wound borders well-approximated.  No surrounding evidence of erythema ecchymosis or fluctuance/induration.    Supine active range of motion guarded 80 degrees -15 degrees.  No evidence of DVT or calf tenderness.    Procedural: Using clean technique all staples today removed with no complications.  Pinpoint bleeding noted controlled with Steri-Strips and a sterile island dressing.    Plan: Patient's refill of his oxycodone to pharmacy on file today.  He will taper as directed.  He has some difficulties with tramadol causing nausea.  He has not started in-home therapy to date but [facility].  The earliest appointment at [facility] in [facility] for outpatient physical therapy looks to be around July 10.  Since he has been through a right primary total knee replacement and knows the expectations based on range of motion stretching activities he will be fine despite having to wait an additional 2 weeks for outpatient to participate in in-home and self-guided comprehensive therapies.    Follow-up in 3 weeks.  X-ray next OV.

## 2025-07-16 ENCOUNTER — OFFICE VISIT (OUTPATIENT)
Age: 70
End: 2025-07-16

## 2025-07-16 VITALS — HEIGHT: 72 IN | WEIGHT: 212 LBS | BODY MASS INDEX: 28.71 KG/M2

## 2025-07-16 DIAGNOSIS — M25.562 LEFT KNEE PAIN, UNSPECIFIED CHRONICITY: Primary | ICD-10-CM

## 2025-07-16 RX ORDER — OXYCODONE HYDROCHLORIDE 5 MG/1
5 TABLET ORAL 2 TIMES DAILY PRN
Qty: 28 TABLET | Refills: 0 | Status: SHIPPED | OUTPATIENT
Start: 2025-07-16 | End: 2025-08-13

## 2025-07-16 NOTE — PROGRESS NOTES
Mode Griffin returns to the office for wound check status post left primary total knee replacement 1 month ago.  He is full weightbearing of his left lower extremity.  surgical incision measuring 21 cm [id number] has continued to mature nicely.    Wound borders well-approximated.  No surrounding evidence of erythema ecchymosis or fluctuance/induration.    Supine active range of motion guarded 85 degrees -5 degrees.  No evidence of DVT or calf tenderness.    Plan: Patient's refill of his oxycodone to pharmacy on file today.  He will taper as directed.  He has some difficulties with tramadol causing nausea.  They will continue in-home therapy and will advise us when he is completed to then begin outpatient services.    Follow-up in 4 weeks.      Advance from 4 post walker to single post cane when appropriate.

## 2025-08-13 ENCOUNTER — HOSPITAL ENCOUNTER (EMERGENCY)
Age: 70
Discharge: HOME | End: 2025-08-13
Attending: EMERGENCY MEDICINE
Payer: OTHER GOVERNMENT

## 2025-08-13 VITALS
RESPIRATION RATE: 18 BRPM | HEIGHT: 73 IN | DIASTOLIC BLOOD PRESSURE: 95 MMHG | BODY MASS INDEX: 28.89 KG/M2 | OXYGEN SATURATION: 99 % | WEIGHT: 218 LBS | HEART RATE: 91 BPM | SYSTOLIC BLOOD PRESSURE: 140 MMHG | TEMPERATURE: 98 F

## 2025-08-13 DIAGNOSIS — L03.213 PRESEPTAL CELLULITIS OF LEFT EYE: Primary | ICD-10-CM

## 2025-08-13 PROCEDURE — 6370000000 HC RX 637 (ALT 250 FOR IP): Performed by: EMERGENCY MEDICINE

## 2025-08-13 PROCEDURE — 99283 EMERGENCY DEPT VISIT LOW MDM: CPT

## 2025-08-13 PROCEDURE — 2500000003 HC RX 250 WO HCPCS: Performed by: EMERGENCY MEDICINE

## 2025-08-13 RX ORDER — ERYTHROMYCIN 5 MG/G
OINTMENT OPHTHALMIC
Qty: 1 G | Refills: 0 | Status: SHIPPED | OUTPATIENT
Start: 2025-08-13 | End: 2025-08-23

## 2025-08-13 RX ORDER — FLUORESCEIN SODIUM 1 MG/MG
1 STRIP OPHTHALMIC ONCE
Status: COMPLETED | OUTPATIENT
Start: 2025-08-13 | End: 2025-08-13

## 2025-08-13 RX ORDER — PROPARACAINE HYDROCHLORIDE 5 MG/ML
2 SOLUTION/ DROPS OPHTHALMIC ONCE
Status: COMPLETED | OUTPATIENT
Start: 2025-08-13 | End: 2025-08-13

## 2025-08-13 RX ADMIN — FLUORESCEIN SODIUM 1 MG: 1 STRIP OPHTHALMIC at 07:38

## 2025-08-13 RX ADMIN — PROPARACAINE HYDROCHLORIDE 2 DROP: 5 SOLUTION/ DROPS OPHTHALMIC at 07:38

## 2025-08-13 ASSESSMENT — PAIN SCALES - GENERAL: PAINLEVEL_OUTOF10: 8

## 2025-08-13 ASSESSMENT — PAIN DESCRIPTION - DESCRIPTORS: DESCRIPTORS: PRESSURE

## 2025-08-13 ASSESSMENT — LIFESTYLE VARIABLES
HOW MANY STANDARD DRINKS CONTAINING ALCOHOL DO YOU HAVE ON A TYPICAL DAY: PATIENT DOES NOT DRINK
HOW OFTEN DO YOU HAVE A DRINK CONTAINING ALCOHOL: NEVER

## 2025-08-13 ASSESSMENT — PAIN DESCRIPTION - PAIN TYPE: TYPE: ACUTE PAIN

## 2025-08-13 ASSESSMENT — PAIN DESCRIPTION - LOCATION: LOCATION: EYE

## 2025-08-13 ASSESSMENT — PAIN DESCRIPTION - ORIENTATION: ORIENTATION: RIGHT

## 2025-08-13 ASSESSMENT — PAIN - FUNCTIONAL ASSESSMENT: PAIN_FUNCTIONAL_ASSESSMENT: 0-10

## 2025-08-18 ENCOUNTER — HOSPITAL ENCOUNTER (OUTPATIENT)
Facility: HOSPITAL | Age: 70
Setting detail: RECURRING SERIES
Discharge: HOME | End: 2025-08-21
Payer: OTHER GOVERNMENT

## 2025-08-18 PROCEDURE — 97161 PT EVAL LOW COMPLEX 20 MIN: CPT

## 2025-08-18 PROCEDURE — 97535 SELF CARE MNGMENT TRAINING: CPT

## 2025-08-22 ENCOUNTER — HOSPITAL ENCOUNTER (OUTPATIENT)
Facility: HOSPITAL | Age: 70
Setting detail: RECURRING SERIES
Discharge: HOME | End: 2025-08-25
Payer: OTHER GOVERNMENT

## 2025-08-22 PROCEDURE — 97530 THERAPEUTIC ACTIVITIES: CPT

## 2025-08-22 PROCEDURE — 97140 MANUAL THERAPY 1/> REGIONS: CPT

## 2025-08-22 PROCEDURE — 97112 NEUROMUSCULAR REEDUCATION: CPT

## 2025-08-22 PROCEDURE — 97110 THERAPEUTIC EXERCISES: CPT

## 2025-08-25 ENCOUNTER — HOSPITAL ENCOUNTER (OUTPATIENT)
Facility: HOSPITAL | Age: 70
Setting detail: RECURRING SERIES
Discharge: HOME | End: 2025-08-28
Payer: OTHER GOVERNMENT

## 2025-08-25 PROCEDURE — 97112 NEUROMUSCULAR REEDUCATION: CPT

## 2025-08-25 PROCEDURE — 97140 MANUAL THERAPY 1/> REGIONS: CPT

## 2025-08-25 PROCEDURE — 97110 THERAPEUTIC EXERCISES: CPT

## 2025-08-25 PROCEDURE — 97530 THERAPEUTIC ACTIVITIES: CPT

## 2025-08-27 ENCOUNTER — APPOINTMENT (OUTPATIENT)
Facility: HOSPITAL | Age: 70
End: 2025-08-27
Payer: OTHER GOVERNMENT

## 2025-09-02 ENCOUNTER — HOSPITAL ENCOUNTER (OUTPATIENT)
Facility: HOSPITAL | Age: 70
Setting detail: RECURRING SERIES
Discharge: HOME | End: 2025-09-05
Payer: OTHER GOVERNMENT

## 2025-09-02 PROCEDURE — 97110 THERAPEUTIC EXERCISES: CPT

## 2025-09-02 PROCEDURE — 97140 MANUAL THERAPY 1/> REGIONS: CPT

## 2025-09-02 PROCEDURE — 97530 THERAPEUTIC ACTIVITIES: CPT

## 2025-09-02 PROCEDURE — 97112 NEUROMUSCULAR REEDUCATION: CPT

## (undated) DEVICE — SOLUTION IRRIG 1000ML 0.9% SOD CHL USP POUR PLAS BTL

## (undated) DEVICE — SOLUTION IRRIG 3000ML 0.9% SOD CHL FLX CONT 0797208] ICU MEDICAL INC]

## (undated) DEVICE — HANDPIECE SET WITH HIGH FLOW TIP AND SUCTION TUBE: Brand: INTERPULSE

## (undated) DEVICE — Device: Brand: JELCO

## (undated) DEVICE — KIT EVAC 400CC DIA1/4IN H PAT 12.5IN 3 SPR RND SHP PVC DRN

## (undated) DEVICE — PACK SURG BSHR TOT KNEE LF

## (undated) DEVICE — STRYKER PERFORMANCE SERIES SAGITTAL BLADE: Brand: STRYKER PERFORMANCE SERIES

## (undated) DEVICE — 3M™ TEGADERM™ TRANSPARENT FILM DRESSING FRAME STYLE, 1626W, 4 IN X 4-3/4 IN (10 CM X 12 CM), 50/CT 4CT/CASE: Brand: 3M™ TEGADERM™

## (undated) DEVICE — 4-PORT MANIFOLD: Brand: NEPTUNE 2

## (undated) DEVICE — BLADE, TONGUE, 6", STERILE: Brand: MEDLINE

## (undated) DEVICE — STERILE POLYISOPRENE POWDER-FREE SURGICAL GLOVES: Brand: PROTEXIS

## (undated) DEVICE — CATHETER URETH 14FR BLLN 5CC SIL HYDRGEL 2 W F SPEC CARS M

## (undated) DEVICE — SUTURE MONOCRYL STRATAFIX SPRL + SZ 2-0 L18IN ABSRB UD CT-1 SXMP1B413

## (undated) DEVICE — DECANTER BAG 9": Brand: MEDLINE INDUSTRIES, INC.

## (undated) DEVICE — SUTURE ABSORBABLE BRAIDED 2-0 CT-1 27 IN UD VICRYL J259H

## (undated) DEVICE — 2108 SERIES SAGITTAL BLADE (24.8 X 1.24 X 80.1MM)

## (undated) DEVICE — KIT OR TURNOVER

## (undated) DEVICE — WATERPROOF, BACTERIA PROOF DRESSING WITH ABSORBENT SEE THROUGH PAD: Brand: OPSITE POST-OP VISIBLE 35X10CM CTN 20

## (undated) DEVICE — ELECTRODE PT RET AD L9FT HI MOIST COND ADH HYDRGEL CORDED

## (undated) DEVICE — Device

## (undated) DEVICE — WATERPROOF, BACTERIA PROOF DRESSING WITH ABSORBENT SEE THROUGH PAD: Brand: OPSITE POST-OP VISIBLE 30X10CM CTN 20

## (undated) DEVICE — PACK SURG CUST BSHR TOT KNEE LF MMC

## (undated) DEVICE — INTENDED FOR TISSUE SEPARATION, AND OTHER PROCEDURES THAT REQUIRE A SHARP SURGICAL BLADE TO PUNCTURE OR CUT.: Brand: BARD-PARKER SAFETY BLADES SIZE 10, STERILE

## (undated) DEVICE — PIN DRL DIA1/8IN QUIK REL FOR VANGUARD UNIV INSTR TOT KNEE 32486265] ZIMMER BIOMET ORTHOPEDIC]

## (undated) DEVICE — HOOD WITH PEEL AWAY FACE SHIELD: Brand: T7PLUS

## (undated) DEVICE — BANDAGE COMPR W6INXL5YD WHT BGE POLY COT M E WRP WV HK AND

## (undated) DEVICE — APPLICATOR MEDICATED 26 CC SOLUTION HI LT ORNG CHLORAPREP

## (undated) DEVICE — SHEET,DRAPE,70X100,STERILE: Brand: MEDLINE

## (undated) DEVICE — PREMIUM DRY TRAY LF: Brand: MEDLINE INDUSTRIES, INC.

## (undated) DEVICE — SUTURE ABSORBABLE ANTIBACT 1-0 CT-1 24 IN STRATAFIX PDS + SXPP1A443

## (undated) DEVICE — TAPE,CLOTH/SILK,CURAD,3"X10YD,LF,40/CS: Brand: CURAD

## (undated) DEVICE — 2108 SERIES SAGITTAL BLADE (20.7 X 0.88 X 85.0MM)

## (undated) DEVICE — SUTURE VICRYL SZ 2-0 L27IN ABSRB UD L26MM SH 1/2 CIR J417H

## (undated) DEVICE — NEEDLE SPNL L3.5IN PNK HUB S STL REG WALL FIT STYL W/ QNCKE

## (undated) DEVICE — DRAPE TWL SURG 16X26IN BLU ORB04] ALLCARE INC]

## (undated) DEVICE — CLEAN UP KIT: Brand: MEDLINE INDUSTRIES, INC.

## (undated) DEVICE — DRESSING FOAM DISK DIA1IN H 7MM HYDRPHLC CHG IMPREG IN SL

## (undated) DEVICE — 450 ML BOTTLE OF 0.05% CHLORHEXIDINE GLUCONATE IN 99.95% STERILE WATER FOR IRRIGATION, USP AND APPLICATOR.: Brand: IRRISEPT ANTIMICROBIAL WOUND LAVAGE

## (undated) DEVICE — OPTIFOAM GENTLE SA, POSTOP, 4X12: Brand: MEDLINE

## (undated) DEVICE — SUTURE STRATAFIX SYMMETRIC SZ 1 L18IN ABSRB VLT CT1 L36CM SXPP1A404

## (undated) DEVICE — BNDG,ELSTC,MATRIX,STRL,6"X5YD,LF,HOOK&LP: Brand: MEDLINE

## (undated) DEVICE — BLADE,STAINLESS-STEEL,10,STRL,DISPOSABLE: Brand: MEDLINE

## (undated) DEVICE — PADDING CAST W6INXL4YD ST COT COHESIVE HND TEARABLE SPEC